# Patient Record
Sex: FEMALE | Race: WHITE | Employment: FULL TIME | URBAN - METROPOLITAN AREA
[De-identification: names, ages, dates, MRNs, and addresses within clinical notes are randomized per-mention and may not be internally consistent; named-entity substitution may affect disease eponyms.]

---

## 2017-01-11 ENCOUNTER — OFFICE VISIT (OUTPATIENT)
Dept: FAMILY MEDICINE CLINIC | Facility: CLINIC | Age: 62
End: 2017-01-11

## 2017-01-11 VITALS
BODY MASS INDEX: 38 KG/M2 | WEIGHT: 238 LBS | SYSTOLIC BLOOD PRESSURE: 145 MMHG | HEART RATE: 101 BPM | DIASTOLIC BLOOD PRESSURE: 98 MMHG | TEMPERATURE: 99 F

## 2017-01-11 DIAGNOSIS — B34.9 ACUTE VIRAL SYNDROME: ICD-10-CM

## 2017-01-11 DIAGNOSIS — J01.10 ACUTE NON-RECURRENT FRONTAL SINUSITIS: Primary | ICD-10-CM

## 2017-01-11 PROCEDURE — 99214 OFFICE O/P EST MOD 30 MIN: CPT | Performed by: FAMILY MEDICINE

## 2017-01-11 PROCEDURE — 99212 OFFICE O/P EST SF 10 MIN: CPT | Performed by: FAMILY MEDICINE

## 2017-01-11 RX ORDER — CODEINE PHOSPHATE AND GUAIFENESIN 10; 100 MG/5ML; MG/5ML
5 SOLUTION ORAL EVERY 6 HOURS PRN
Qty: 120 ML | Refills: 1 | Status: SHIPPED | OUTPATIENT
Start: 2017-01-11 | End: 2017-07-10

## 2017-01-11 RX ORDER — AMOXICILLIN 500 MG/1
500 CAPSULE ORAL 2 TIMES DAILY
Qty: 20 CAPSULE | Refills: 0 | Status: SHIPPED | OUTPATIENT
Start: 2017-01-11 | End: 2017-01-21

## 2017-01-11 NOTE — PROGRESS NOTES
2017 9:56 AM    Niki Frost, : 1955  Patient presents with:  URI: Patient c/o sneezing, body aches, post nasal drip, headache,cough, ear pain, vomiting and diarrhea.  Has been taking OTC theraflu      HPI:     Niki Frost is a 62year old fe Ovarian cyst    • Unspecified essential hypertension        Past Surgical History:       Past Surgical History          REMOVAL OF OVARIAN CYST(S)         Social History:     Social History   Marital Status:   Spouse Name: N/A    Years of E (from the past 10 hour(s)). MDM/Assessment/Plan:   Assessment and Plan:    Diagnosis:    ICD-10-CM    1. Acute non-recurrent frontal sinusitis J01.10    2.  Acute viral syndrome B34.9        MEDICATIONS: •  amoxicillin 500 MG Oral Cap, Take 1 capsule (50

## 2017-07-10 ENCOUNTER — OFFICE VISIT (OUTPATIENT)
Dept: FAMILY MEDICINE CLINIC | Facility: CLINIC | Age: 62
End: 2017-07-10

## 2017-07-10 ENCOUNTER — LAB ENCOUNTER (OUTPATIENT)
Dept: LAB | Age: 62
End: 2017-07-10
Attending: FAMILY MEDICINE
Payer: COMMERCIAL

## 2017-07-10 VITALS
WEIGHT: 238 LBS | HEIGHT: 67 IN | BODY MASS INDEX: 37.35 KG/M2 | SYSTOLIC BLOOD PRESSURE: 112 MMHG | DIASTOLIC BLOOD PRESSURE: 73 MMHG | HEART RATE: 92 BPM

## 2017-07-10 DIAGNOSIS — R60.9 EDEMA, UNSPECIFIED TYPE: ICD-10-CM

## 2017-07-10 DIAGNOSIS — I10 ESSENTIAL HYPERTENSION: ICD-10-CM

## 2017-07-10 DIAGNOSIS — M79.672 LEFT FOOT PAIN: ICD-10-CM

## 2017-07-10 DIAGNOSIS — I10 ESSENTIAL HYPERTENSION: Primary | ICD-10-CM

## 2017-07-10 DIAGNOSIS — Z12.31 VISIT FOR SCREENING MAMMOGRAM: ICD-10-CM

## 2017-07-10 DIAGNOSIS — Z12.11 COLON CANCER SCREENING: ICD-10-CM

## 2017-07-10 LAB
ALBUMIN SERPL BCP-MCNC: 3.6 G/DL (ref 3.5–4.8)
ALBUMIN/GLOB SERPL: 1.2 {RATIO} (ref 1–2)
ALP SERPL-CCNC: 76 U/L (ref 32–100)
ALT SERPL-CCNC: 17 U/L (ref 14–54)
ANION GAP SERPL CALC-SCNC: 7 MMOL/L (ref 0–18)
AST SERPL-CCNC: 17 U/L (ref 15–41)
BASOPHILS # BLD: 0.1 K/UL (ref 0–0.2)
BASOPHILS NFR BLD: 1 %
BILIRUB SERPL-MCNC: 0.7 MG/DL (ref 0.3–1.2)
BUN SERPL-MCNC: 17 MG/DL (ref 8–20)
BUN/CREAT SERPL: 21.8 (ref 10–20)
CALCIUM SERPL-MCNC: 9.2 MG/DL (ref 8.5–10.5)
CHLORIDE SERPL-SCNC: 107 MMOL/L (ref 95–110)
CO2 SERPL-SCNC: 27 MMOL/L (ref 22–32)
CREAT SERPL-MCNC: 0.78 MG/DL (ref 0.5–1.5)
EOSINOPHIL # BLD: 0.2 K/UL (ref 0–0.7)
EOSINOPHIL NFR BLD: 3 %
ERYTHROCYTE [DISTWIDTH] IN BLOOD BY AUTOMATED COUNT: 13.3 % (ref 11–15)
GLOBULIN PLAS-MCNC: 2.9 G/DL (ref 2.5–3.7)
GLUCOSE SERPL-MCNC: 90 MG/DL (ref 70–99)
HCT VFR BLD AUTO: 40 % (ref 35–48)
HGB BLD-MCNC: 13 G/DL (ref 12–16)
LYMPHOCYTES # BLD: 2.1 K/UL (ref 1–4)
LYMPHOCYTES NFR BLD: 30 %
MCH RBC QN AUTO: 27.7 PG (ref 27–32)
MCHC RBC AUTO-ENTMCNC: 32.6 G/DL (ref 32–37)
MCV RBC AUTO: 84.8 FL (ref 80–100)
MONOCYTES # BLD: 0.5 K/UL (ref 0–1)
MONOCYTES NFR BLD: 8 %
NEUTROPHILS # BLD AUTO: 4.1 K/UL (ref 1.8–7.7)
NEUTROPHILS NFR BLD: 59 %
OSMOLALITY UR CALC.SUM OF ELEC: 293 MOSM/KG (ref 275–295)
PLATELET # BLD AUTO: 213 K/UL (ref 140–400)
PMV BLD AUTO: 10.1 FL (ref 7.4–10.3)
POTASSIUM SERPL-SCNC: 4 MMOL/L (ref 3.3–5.1)
PROT SERPL-MCNC: 6.5 G/DL (ref 5.9–8.4)
RBC # BLD AUTO: 4.71 M/UL (ref 3.7–5.4)
SODIUM SERPL-SCNC: 141 MMOL/L (ref 136–144)
TSH SERPL-ACNC: 0.9 UIU/ML (ref 0.45–5.33)
WBC # BLD AUTO: 7 K/UL (ref 4–11)

## 2017-07-10 PROCEDURE — 99214 OFFICE O/P EST MOD 30 MIN: CPT | Performed by: FAMILY MEDICINE

## 2017-07-10 PROCEDURE — 99212 OFFICE O/P EST SF 10 MIN: CPT | Performed by: FAMILY MEDICINE

## 2017-07-10 PROCEDURE — 85025 COMPLETE CBC W/AUTO DIFF WBC: CPT

## 2017-07-10 PROCEDURE — 82274 ASSAY TEST FOR BLOOD FECAL: CPT

## 2017-07-10 PROCEDURE — 80053 COMPREHEN METABOLIC PANEL: CPT

## 2017-07-10 PROCEDURE — 84443 ASSAY THYROID STIM HORMONE: CPT

## 2017-07-10 PROCEDURE — 36415 COLL VENOUS BLD VENIPUNCTURE: CPT

## 2017-07-10 RX ORDER — LOSARTAN POTASSIUM AND HYDROCHLOROTHIAZIDE 12.5; 5 MG/1; MG/1
1 TABLET ORAL
Qty: 30 TABLET | Refills: 11 | Status: SHIPPED | OUTPATIENT
Start: 2017-07-10 | End: 2018-01-09

## 2017-07-10 RX ORDER — NAPROXEN 500 MG/1
500 TABLET ORAL 2 TIMES DAILY WITH MEALS
Qty: 28 TABLET | Refills: 0 | Status: SHIPPED | OUTPATIENT
Start: 2017-07-10 | End: 2017-07-27 | Stop reason: ALTCHOICE

## 2017-07-10 NOTE — PROGRESS NOTES
Willie Fuentes is a 62year old female. Patient presents with:  Hypertension: follow up, medicationr refills  Foot Pain: L     HPI:   Pt reports left foot pain for about 6 months - swelling before that - only on left foot.  Wears only good shoes while working FOB testing only   - OCCULT BLOOD, FECAL, IMMUNOASSAY; Future    3. Left foot pain  Naproxen BID with food. - PODIATRY - INTERNAL    4. Edema, unspecified type    - PODIATRY - INTERNAL  - CARD ECHO 2D DOPPLER (CPT=93306); Future    5.  Visit for screening

## 2017-07-13 ENCOUNTER — HOSPITAL ENCOUNTER (OUTPATIENT)
Dept: GENERAL RADIOLOGY | Age: 62
Discharge: HOME | End: 2017-07-13
Attending: FAMILY MEDICINE
Payer: COMMERCIAL

## 2017-07-13 DIAGNOSIS — M79.672 LEFT FOOT PAIN: ICD-10-CM

## 2017-07-13 LAB — HEMOCCULT STL QL: POSITIVE

## 2017-07-13 PROCEDURE — 73630 X-RAY EXAM OF FOOT: CPT | Performed by: FAMILY MEDICINE

## 2017-07-15 DIAGNOSIS — Z12.11 COLON CANCER SCREENING: Primary | ICD-10-CM

## 2017-07-15 DIAGNOSIS — R19.5 POSITIVE OCCULT STOOL BLOOD TEST: ICD-10-CM

## 2017-07-15 NOTE — PROGRESS NOTES
Please let know that she was positive for blood in her stool and I would like her to see the GI doctor to have the colonoscopy for sure. I have placed a referral for her to set up an appointment.

## 2017-07-17 ENCOUNTER — HOSPITAL ENCOUNTER (OUTPATIENT)
Dept: MAMMOGRAPHY | Age: 62
Discharge: HOME | End: 2017-07-17
Attending: FAMILY MEDICINE
Payer: COMMERCIAL

## 2017-07-17 ENCOUNTER — TELEPHONE (OUTPATIENT)
Dept: FAMILY MEDICINE CLINIC | Facility: CLINIC | Age: 62
End: 2017-07-17

## 2017-07-17 ENCOUNTER — HOSPITAL ENCOUNTER (OUTPATIENT)
Dept: CARDIOLOGY CLINIC | Age: 62
Discharge: HOME | End: 2017-07-17
Attending: FAMILY MEDICINE
Payer: COMMERCIAL

## 2017-07-17 DIAGNOSIS — I10 ESSENTIAL HYPERTENSION: ICD-10-CM

## 2017-07-17 DIAGNOSIS — R60.9 EDEMA, UNSPECIFIED TYPE: ICD-10-CM

## 2017-07-17 DIAGNOSIS — Z12.31 VISIT FOR SCREENING MAMMOGRAM: ICD-10-CM

## 2017-07-17 PROCEDURE — 93306 TTE W/DOPPLER COMPLETE: CPT | Performed by: FAMILY MEDICINE

## 2017-07-17 PROCEDURE — 77067 SCR MAMMO BI INCL CAD: CPT | Performed by: FAMILY MEDICINE

## 2017-07-17 NOTE — TELEPHONE ENCOUNTER
----- Message from Katerin Vasquez MD sent at 7/15/2017  2:15 PM CDT -----  Please let know that she was positive for blood in her stool and I would like her to see the GI doctor to have the colonoscopy for sure.   I have placed a referral for her to set u

## 2017-07-17 NOTE — TELEPHONE ENCOUNTER
Spoke with patient ( verified name and ), reviewed information, patient verbalized understanding and agrees with plan. States will call back to get GI doctor name and # as she is at work right now and not able to write the information down.  Patient Mary Arrieta

## 2017-07-17 NOTE — TELEPHONE ENCOUNTER
----- Message from Alma Delia Gamez MD sent at 7/15/2017  2:16 PM CDT -----  Tests are all normal. X-ray was normal - no fracture

## 2017-07-17 NOTE — TELEPHONE ENCOUNTER
----- Message from Enedina Resendez MD sent at 7/15/2017  2:07 PM CDT -----  Labs are stable. No changes to medications.

## 2017-07-27 ENCOUNTER — OFFICE VISIT (OUTPATIENT)
Dept: PODIATRY CLINIC | Facility: CLINIC | Age: 62
End: 2017-07-27

## 2017-07-27 ENCOUNTER — TELEPHONE (OUTPATIENT)
Dept: FAMILY MEDICINE CLINIC | Facility: CLINIC | Age: 62
End: 2017-07-27

## 2017-07-27 DIAGNOSIS — M72.2 PLANTAR FASCIITIS OF LEFT FOOT: ICD-10-CM

## 2017-07-27 DIAGNOSIS — M79.672 LEFT FOOT PAIN: Primary | ICD-10-CM

## 2017-07-27 PROCEDURE — 99212 OFFICE O/P EST SF 10 MIN: CPT | Performed by: PODIATRIST

## 2017-07-27 PROCEDURE — 99243 OFF/OP CNSLTJ NEW/EST LOW 30: CPT | Performed by: PODIATRIST

## 2017-07-27 PROCEDURE — L3060 FOOT ARCH SUPP LONGITUD/META: HCPCS | Performed by: PODIATRIST

## 2017-07-27 RX ORDER — NAPROXEN 500 MG/1
500 TABLET ORAL 2 TIMES DAILY WITH MEALS
Qty: 60 TABLET | Refills: 1 | Status: SHIPPED | OUTPATIENT
Start: 2017-07-27 | End: 2018-01-09 | Stop reason: ALTCHOICE

## 2017-07-27 NOTE — PROGRESS NOTES
HPI:    Patient ID: Uinque Moss is a 62year old female. HPI  This pleasant 72-year-old female presents as a new patient to me on consult from [facility]. Patient's chief complaint is swelling and pain of the left foot.   It has been present for the las instructed to ice twice every evening for 15 minutes. I plan to reevaluate in a couple of weeks to assess the benefits of care with other considerations of treatment depending upon how she responds.   See patient in 3 weeks         ASSESSMENT/PLAN:   Left

## 2017-07-27 NOTE — TELEPHONE ENCOUNTER
Pt states that she has an appt with /Podiatry on 8-21-17 for follow up. Pt is requesting 3 office visits on the referral. Please, call pt with any questions.

## 2017-08-03 ENCOUNTER — TELEPHONE (OUTPATIENT)
Dept: FAMILY MEDICINE CLINIC | Facility: CLINIC | Age: 62
End: 2017-08-03

## 2017-08-03 NOTE — TELEPHONE ENCOUNTER
Patient called and stated has questions and concerns regarding the following medication-  Patient is requesting a call back from nurse-      Current Outpatient Prescriptions:  Losartan Potassium-HCTZ 50-12.5 MG Oral Tab Take 1 tablet by mouth once daily.  D

## 2017-08-04 NOTE — TELEPHONE ENCOUNTER
Pt wondering if an additional seperate diuretic would be helpful. Pt experiencing swelling in both feet but worse on left. States PCP is aware of it and referred to podiatry. Pt advised in F/U appt but states unable to commit at this time.   Encouraged t

## 2017-08-05 NOTE — TELEPHONE ENCOUNTER
Reason for call changed from medical question to medication question  LMTCB please transfer to RN [phone]

## 2017-08-08 NOTE — TELEPHONE ENCOUNTER
Pt returned call, reviewed Dr Dewayne Cardoso orders with pt. Stated the cost is not the issue because she only pays $4.00 for losartan-hctz 50-12 mg. Inquiring why she continues with edema to lower legs and if an alternative medication would work better.  Stated she d

## 2017-08-11 RX ORDER — LOSARTAN POTASSIUM 50 MG/1
50 TABLET ORAL DAILY
Qty: 90 TABLET | Refills: 4 | Status: SHIPPED | OUTPATIENT
Start: 2017-08-11 | End: 2018-01-09

## 2017-08-11 RX ORDER — CHLORTHALIDONE 25 MG/1
25 TABLET ORAL DAILY
Qty: 90 TABLET | Refills: 1 | Status: SHIPPED | OUTPATIENT
Start: 2017-08-11 | End: 2018-01-09

## 2017-08-11 NOTE — TELEPHONE ENCOUNTER
[phone] Nannette Strickland I will try send losartan 50 mg daily and take separate water pill chlorthalidone in  Mornings

## 2017-09-25 ENCOUNTER — TELEPHONE (OUTPATIENT)
Dept: INTERNAL MEDICINE CLINIC | Facility: CLINIC | Age: 62
End: 2017-09-25

## 2017-09-25 NOTE — TELEPHONE ENCOUNTER
Patient due for colonoscopy/GI consult, please see 7/17/17 encounter. Referral is in Epic. If patient returns call please assist in scheduling. Left message to call back [phone].

## 2018-01-09 ENCOUNTER — OFFICE VISIT (OUTPATIENT)
Dept: FAMILY MEDICINE CLINIC | Facility: CLINIC | Age: 63
End: 2018-01-09

## 2018-01-09 VITALS
WEIGHT: 242.63 LBS | SYSTOLIC BLOOD PRESSURE: 116 MMHG | BODY MASS INDEX: 38 KG/M2 | HEART RATE: 89 BPM | DIASTOLIC BLOOD PRESSURE: 75 MMHG

## 2018-01-09 DIAGNOSIS — K21.9 GASTROESOPHAGEAL REFLUX DISEASE WITHOUT ESOPHAGITIS: ICD-10-CM

## 2018-01-09 DIAGNOSIS — I10 ESSENTIAL HYPERTENSION: Primary | ICD-10-CM

## 2018-01-09 PROCEDURE — 99212 OFFICE O/P EST SF 10 MIN: CPT | Performed by: FAMILY MEDICINE

## 2018-01-09 PROCEDURE — 99213 OFFICE O/P EST LOW 20 MIN: CPT | Performed by: FAMILY MEDICINE

## 2018-01-09 RX ORDER — LOSARTAN POTASSIUM 50 MG/1
50 TABLET ORAL DAILY
Qty: 90 TABLET | Refills: 4 | Status: SHIPPED | OUTPATIENT
Start: 2018-01-09 | End: 2018-07-12

## 2018-01-09 RX ORDER — CHLORTHALIDONE 25 MG/1
25 TABLET ORAL DAILY
Qty: 90 TABLET | Refills: 4 | Status: ON HOLD | OUTPATIENT
Start: 2018-01-09 | End: 2018-03-27

## 2018-01-09 RX ORDER — RANITIDINE 300 MG/1
150 TABLET ORAL NIGHTLY
Qty: 45 TABLET | Refills: 4 | Status: SHIPPED | OUTPATIENT
Start: 2018-01-09 | End: 2019-01-12

## 2018-01-09 NOTE — PROGRESS NOTES
Kye Cotton is a 65year old female. Patient presents with:  Hypertension  Reflux    HPI:   Pt here for follow up for BP check but also new heartburn is almost daily. Tried otc meds - prilosec but expensive. Been using it as needed.  Eats less spicy food 1. Essential hypertension  BP controlled. No changes to medications. 2. Gastroesophageal reflux disease without esophagitis  Ranitidine evenings. Decrease coffee.  Stop NSAIDs for pain - tylenol           The patient indicates understanding of these i

## 2018-03-22 ENCOUNTER — NURSE TRIAGE (OUTPATIENT)
Dept: OTHER | Age: 63
End: 2018-03-22

## 2018-03-22 NOTE — TELEPHONE ENCOUNTER
Action Requested: Summary for Provider     []  Critical Lab, Recommendations Needed  [x] Need Additional Advice  []   FYI    []   Need Orders  [] Need Medications Sent to Pharmacy  []  Other     SUMMARY: pt declined recommendation to go to ER for vomiting

## 2018-03-22 NOTE — TELEPHONE ENCOUNTER
Sounds like she has the flu. Hydration with smart water. If not keeping down fluids, needs to go to the ER for fluid rehydration. Sorry but cannot see her. Can see if someone available for tomorrow if not improving.

## 2018-03-22 NOTE — TELEPHONE ENCOUNTER
Advised patient on Dr. Tom Cheung information and recommendations. Patient verbalized understanding. She said her last vomiting was this morning, 1 hour after her call here and her diarrhea stopped this morning.  She's been drinking sips of water and Gatorade,

## 2018-03-23 ENCOUNTER — HOSPITAL ENCOUNTER (OUTPATIENT)
Age: 63
Discharge: HOME | End: 2018-03-23
Attending: EMERGENCY MEDICINE
Payer: COMMERCIAL

## 2018-03-23 VITALS
SYSTOLIC BLOOD PRESSURE: 116 MMHG | RESPIRATION RATE: 18 BRPM | HEART RATE: 95 BPM | DIASTOLIC BLOOD PRESSURE: 52 MMHG | WEIGHT: 220 LBS | BODY MASS INDEX: 34 KG/M2 | OXYGEN SATURATION: 97 % | TEMPERATURE: 98 F

## 2018-03-23 DIAGNOSIS — E86.0 DEHYDRATION: Primary | ICD-10-CM

## 2018-03-23 LAB
URINE COLOR: YELLOW
URINE GLUCOSE: NEGATIVE MG/DL
URINE KETONES: 15 MG/DL
URINE LEUKOCYTE ESTERASE: NEGATIVE
URINE NITRITE: NEGATIVE
URINE PH: 5.5
URINE SPECIFIC GRAVITY: >=1.03
URINE UROBILINOGEN: 1 MG/DL

## 2018-03-23 PROCEDURE — 96361 HYDRATE IV INFUSION ADD-ON: CPT

## 2018-03-23 PROCEDURE — 96374 THER/PROPH/DIAG INJ IV PUSH: CPT

## 2018-03-23 PROCEDURE — 81002 URINALYSIS NONAUTO W/O SCOPE: CPT

## 2018-03-23 PROCEDURE — 99215 OFFICE O/P EST HI 40 MIN: CPT

## 2018-03-23 RX ORDER — ONDANSETRON 2 MG/ML
4 INJECTION INTRAMUSCULAR; INTRAVENOUS ONCE
Status: COMPLETED | OUTPATIENT
Start: 2018-03-23 | End: 2018-03-23

## 2018-03-23 RX ORDER — 0.9 % SODIUM CHLORIDE 0.9 %
1000 INTRAVENOUS SOLUTION INTRAVENOUS ONCE
Status: COMPLETED | OUTPATIENT
Start: 2018-03-23 | End: 2018-03-23

## 2018-03-23 RX ORDER — ONDANSETRON 4 MG/1
4 TABLET, FILM COATED ORAL EVERY 8 HOURS PRN
Qty: 10 TABLET | Refills: 0 | Status: SHIPPED | OUTPATIENT
Start: 2018-03-23 | End: 2018-03-23

## 2018-03-23 RX ORDER — ONDANSETRON 4 MG/1
4 TABLET, FILM COATED ORAL EVERY 8 HOURS PRN
Qty: 10 TABLET | Refills: 0 | Status: SHIPPED | OUTPATIENT
Start: 2018-03-23 | End: 2018-04-03

## 2018-03-23 NOTE — ED PROVIDER NOTES
Patient Seen in: [facility] In [address] [place] [address]    History   Patient presents with:  Nausea/Vomiting/Diarrhea (gastrointestinal)    Stated Complaint: vomiting,weak    HPI    58-year-old female patient states that she had 2-3 days of diarrhe Exam    Gen: Awake alert in no apparent distress  HEENT: Anicteric, EOMI, PERRL, dry  oropharynx  Heart: Regular rate and rhythm, no murmur  Lungs: Normal respiratory effort, clear lungs  Abdomen: Soft,  nondistended, non tender  : No CVA tenderness  Ski hours as needed for Nausea.   Qty: 10 tablet Refills: 0

## 2018-03-23 NOTE — TELEPHONE ENCOUNTER
Called pt as a f/u and pt states she feels weak today (pt sounded tired on the phone) but again refuses ER and declines my calling an ambulance. States is able keep fluids though as is no longer vomiting (Gatorade and water).  Informed pt if covered by insu

## 2018-03-23 NOTE — ED INITIAL ASSESSMENT (HPI)
Ill for past week with vomiting and diarrhea. Called pmd and instructed to go to ED. Waited for today.

## 2018-03-24 NOTE — TELEPHONE ENCOUNTER
Pt returned call. States she was seen at [facility] yesterday. Received IV hydration and rx for zofran. Today pt denied any vomiting, diarrhea. States she feels slightly better. Has not had a need to take zofran rx.  Will continue to stay hydrated at home and foll

## 2018-03-24 NOTE — TELEPHONE ENCOUNTER
Patient was left a message to call back. f/u on how patient is doing. Transfer to [id number]      Chart reviewed; patient did go to IC on 3/23/18; IV fluids given, rx for zofran.

## 2018-03-25 ENCOUNTER — APPOINTMENT (OUTPATIENT)
Dept: GENERAL RADIOLOGY | Facility: HOSPITAL | Age: 63
End: 2018-03-25
Attending: EMERGENCY MEDICINE
Payer: COMMERCIAL

## 2018-03-25 ENCOUNTER — APPOINTMENT (OUTPATIENT)
Dept: ULTRASOUND IMAGING | Facility: HOSPITAL | Age: 63
End: 2018-03-25
Attending: HOSPITALIST
Payer: COMMERCIAL

## 2018-03-25 ENCOUNTER — HOSPITAL ENCOUNTER (OUTPATIENT)
Facility: HOSPITAL | Age: 63
Setting detail: OBSERVATION
Discharge: HOME | End: 2018-03-27
Attending: EMERGENCY MEDICINE | Admitting: HOSPITALIST
Payer: COMMERCIAL

## 2018-03-25 DIAGNOSIS — E86.0 DEHYDRATION: ICD-10-CM

## 2018-03-25 DIAGNOSIS — E87.6 HYPOKALEMIA: Primary | ICD-10-CM

## 2018-03-25 LAB
ADENOVIRUS F 40/41 PCR: NEGATIVE
ANION GAP SERPL CALC-SCNC: 14 MMOL/L (ref 0–18)
ASTROVIRUS PCR: NEGATIVE
BASOPHILS # BLD: 0.5 K/UL (ref 0–0.2)
BASOPHILS NFR BLD: 3 %
BILIRUB UR QL: NEGATIVE
BUN SERPL-MCNC: 17 MG/DL (ref 8–20)
BUN/CREAT SERPL: 8.1 (ref 10–20)
C CAYETANENSIS DNA SPEC QL NAA+PROBE: NEGATIVE
C. DIFFICILE TOXIN A/B PCR: NEGATIVE
CALCIUM SERPL-MCNC: 8.5 MG/DL (ref 8.5–10.5)
CAMPY SP DNA.DIARRHEA STL QL NAA+PROBE: NEGATIVE
CHLORIDE SERPL-SCNC: 93 MMOL/L (ref 95–110)
CO2 SERPL-SCNC: 30 MMOL/L (ref 22–32)
COLOR UR: YELLOW
CREAT SERPL-MCNC: 1.88 MG/DL (ref 0.5–1.5)
CREAT SERPL-MCNC: 2.1 MG/DL (ref 0.5–1.5)
CRYPTOSP DNA SPEC QL NAA+PROBE: NEGATIVE
EAEC PAA PLAS AGGR+AATA ST NAA+NON-PRB: NEGATIVE
EC STX1+STX2 + H7 FLIC SPEC NAA+PROBE: NEGATIVE
ENTAMOEBA HISTOLYTICA PCR: NEGATIVE
EOSINOPHIL # BLD: 0.3 K/UL (ref 0–0.7)
EOSINOPHIL NFR BLD: 2 %
EPEC EAE GENE STL QL NAA+NON-PROBE: NEGATIVE
ERYTHROCYTE [DISTWIDTH] IN BLOOD BY AUTOMATED COUNT: 14.2 % (ref 11–15)
ETEC LTA+ST1A+ST1B TOX ST NAA+NON-PROBE: NEGATIVE
GIARDIA LAMBLIA PCR: NEGATIVE
GLUCOSE SERPL-MCNC: 143 MG/DL (ref 70–99)
GLUCOSE UR-MCNC: 50 MG/DL
HCT VFR BLD AUTO: 35.9 % (ref 35–48)
HGB BLD-MCNC: 12.4 G/DL (ref 12–16)
HYALINE CASTS #/AREA URNS AUTO: 12 /LPF
LEUKOCYTE ESTERASE UR QL STRIP.AUTO: NEGATIVE
LYMPHOCYTES # BLD: 2.7 K/UL (ref 1–4)
LYMPHOCYTES NFR BLD: 18 %
MCH RBC QN AUTO: 28.3 PG (ref 27–32)
MCHC RBC AUTO-ENTMCNC: 34.6 G/DL (ref 32–37)
MCV RBC AUTO: 81.7 FL (ref 80–100)
METAMYELOCYTES # BLD MANUAL: 0.15 K/UL
MONOCYTES # BLD: 0.5 K/UL (ref 0–1)
MONOCYTES NFR BLD: 3 %
MYELOCYTES NFR BLD: 1 %
NEUTROPHILS # BLD AUTO: 10.9 K/UL (ref 1.8–7.7)
NEUTROPHILS NFR BLD: 72 %
NEUTS BAND NFR BLD: 1 %
NITRITE UR QL STRIP.AUTO: NEGATIVE
NOROVIRUS GI/GII PCR: NEGATIVE
NRBC BLD-RTO: 1 % (ref ?–1)
OSMOLALITY UR CALC.SUM OF ELEC: 288 MOSM/KG (ref 275–295)
P SHIGELLOIDES DNA STL QL NAA+PROBE: NEGATIVE
PH UR: 5 [PH] (ref 5–8)
PLATELET # BLD AUTO: 235 K/UL (ref 140–400)
PMV BLD AUTO: 9.3 FL (ref 7.4–10.3)
POTASSIUM SERPL-SCNC: 2.2 MMOL/L (ref 3.3–5.1)
POTASSIUM SERPL-SCNC: 3 MMOL/L (ref 3.3–5.1)
PROT UR-MCNC: 30 MG/DL
RBC # BLD AUTO: 4.39 M/UL (ref 3.7–5.4)
RBC #/AREA URNS AUTO: 5 /HPF
ROTAVIRUS A PCR: NEGATIVE
SALMONELLA DNA SPEC QL NAA+PROBE: NEGATIVE
SAPOVIRUS PCR: NEGATIVE
SHIGELLA SP+EIEC IPAH ST NAA+NON-PROBE: NEGATIVE
SODIUM SERPL-SCNC: 137 MMOL/L (ref 136–144)
SP GR UR STRIP: 1.02 (ref 1–1.03)
UROBILINOGEN UR STRIP-ACNC: <2
V CHOLERAE DNA SPEC QL NAA+PROBE: NEGATIVE
VIBRIO DNA SPEC NAA+PROBE: NEGATIVE
VIT C UR-MCNC: NEGATIVE MG/DL
WBC # BLD AUTO: 14.9 K/UL (ref 4–11)
WBC #/AREA URNS AUTO: 12 /HPF
YERSINIA DNA SPEC NAA+PROBE: NEGATIVE

## 2018-03-25 PROCEDURE — 71046 X-RAY EXAM CHEST 2 VIEWS: CPT | Performed by: EMERGENCY MEDICINE

## 2018-03-25 PROCEDURE — 99219 INITIAL OBSERVATION CARE,LEVL II: CPT | Performed by: HOSPITALIST

## 2018-03-25 PROCEDURE — 93971 EXTREMITY STUDY: CPT | Performed by: HOSPITALIST

## 2018-03-25 RX ORDER — MORPHINE SULFATE 2 MG/ML
1 INJECTION, SOLUTION INTRAMUSCULAR; INTRAVENOUS EVERY 2 HOUR PRN
Status: DISCONTINUED | OUTPATIENT
Start: 2018-03-25 | End: 2018-03-27

## 2018-03-25 RX ORDER — POTASSIUM CHLORIDE 20 MEQ/1
40 TABLET, EXTENDED RELEASE ORAL ONCE
Status: COMPLETED | OUTPATIENT
Start: 2018-03-25 | End: 2018-03-25

## 2018-03-25 RX ORDER — ACETAMINOPHEN 325 MG/1
650 TABLET ORAL EVERY 6 HOURS PRN
Status: DISCONTINUED | OUTPATIENT
Start: 2018-03-25 | End: 2018-03-25

## 2018-03-25 RX ORDER — PANTOPRAZOLE SODIUM 40 MG/1
40 TABLET, DELAYED RELEASE ORAL
Status: DISCONTINUED | OUTPATIENT
Start: 2018-03-26 | End: 2018-03-27

## 2018-03-25 RX ORDER — HEPARIN SODIUM 5000 [USP'U]/ML
5000 INJECTION, SOLUTION INTRAVENOUS; SUBCUTANEOUS EVERY 8 HOURS SCHEDULED
Status: DISCONTINUED | OUTPATIENT
Start: 2018-03-25 | End: 2018-03-27

## 2018-03-25 RX ORDER — MORPHINE SULFATE 4 MG/ML
4 INJECTION, SOLUTION INTRAMUSCULAR; INTRAVENOUS EVERY 2 HOUR PRN
Status: DISCONTINUED | OUTPATIENT
Start: 2018-03-25 | End: 2018-03-27

## 2018-03-25 RX ORDER — ONDANSETRON 2 MG/ML
4 INJECTION INTRAMUSCULAR; INTRAVENOUS EVERY 4 HOURS PRN
Status: DISCONTINUED | OUTPATIENT
Start: 2018-03-25 | End: 2018-03-27

## 2018-03-25 RX ORDER — MORPHINE SULFATE 2 MG/ML
2 INJECTION, SOLUTION INTRAMUSCULAR; INTRAVENOUS EVERY 2 HOUR PRN
Status: DISCONTINUED | OUTPATIENT
Start: 2018-03-25 | End: 2018-03-27

## 2018-03-25 RX ORDER — NITROGLYCERIN 0.4 MG/1
0.4 TABLET SUBLINGUAL EVERY 5 MIN PRN
Status: DISCONTINUED | OUTPATIENT
Start: 2018-03-25 | End: 2018-03-27

## 2018-03-25 RX ORDER — HYDROCODONE BITARTRATE AND ACETAMINOPHEN 5; 325 MG/1; MG/1
2 TABLET ORAL EVERY 4 HOURS PRN
Status: DISCONTINUED | OUTPATIENT
Start: 2018-03-25 | End: 2018-03-27

## 2018-03-25 RX ORDER — ACETAMINOPHEN 500 MG
1000 TABLET ORAL ONCE
Status: COMPLETED | OUTPATIENT
Start: 2018-03-25 | End: 2018-03-25

## 2018-03-25 RX ORDER — ACETAMINOPHEN 325 MG/1
650 TABLET ORAL EVERY 4 HOURS PRN
Status: DISCONTINUED | OUTPATIENT
Start: 2018-03-25 | End: 2018-03-27

## 2018-03-25 RX ORDER — ZOLPIDEM TARTRATE 5 MG/1
5 TABLET ORAL NIGHTLY PRN
Status: DISCONTINUED | OUTPATIENT
Start: 2018-03-25 | End: 2018-03-27

## 2018-03-25 RX ORDER — HYDROCODONE BITARTRATE AND ACETAMINOPHEN 5; 325 MG/1; MG/1
1 TABLET ORAL EVERY 4 HOURS PRN
Status: DISCONTINUED | OUTPATIENT
Start: 2018-03-25 | End: 2018-03-27

## 2018-03-25 RX ORDER — SODIUM CHLORIDE 0.9 % (FLUSH) 0.9 %
3 SYRINGE (ML) INJECTION AS NEEDED
Status: DISCONTINUED | OUTPATIENT
Start: 2018-03-25 | End: 2018-03-27

## 2018-03-25 RX ORDER — HYDRALAZINE HYDROCHLORIDE 20 MG/ML
10 INJECTION INTRAMUSCULAR; INTRAVENOUS EVERY 4 HOURS PRN
Status: DISCONTINUED | OUTPATIENT
Start: 2018-03-25 | End: 2018-03-27

## 2018-03-25 NOTE — ED INITIAL ASSESSMENT (HPI)
Pt seen at [address] [place] [address] on Friday with N/V/D. Is able to keep fluids down and states that she has stopped vomiting and having diarrhea, but feels weak.

## 2018-03-25 NOTE — ED NOTES
Patient states that lest week she had some viral N/V/D. Was seen a few days ago at [address] [place] [address] in [address] [place] [address] and given IV fluids, which she says made her feel better. Although she is able to keep down fluids, she stays she still feels weak. Denies N/V/D.

## 2018-03-25 NOTE — PLAN OF CARE
Patient/Family Goals    • Patient/Family Long Term Goal Progressing    • Patient/Family Short Term Goal Progressing        Pt admitted with weakness following virus at home. Electrolyte replacment. Iv fluids. Clear liquid diet. Gi panel.

## 2018-03-25 NOTE — ED PROVIDER NOTES
Patient Seen in: [facility] Department    History   Patient presents with:  Weakness    Stated Complaint: weakness    HPI    60-year-old female with history of hypertension presents with complaints of nausea, generalized weakness, body a alert, no distress  Eyes: pupils equal and round no pallor or injection  ENT, Mouth: mucous membranes moist  Respiratory: there are no retractions, lungs are clear to auscultation. No chest wall tenderness.   Cardiovascular: regular rate and rhythm  Gastro monitoring.   Discussed with Dr. Cher Freitas, hospitalist.      Admission disposition: 3/25/2018 12:50 PM           Disposition and Plan     Clinical Impression:  Hypokalemia  (primary encounter diagnosis)  Dehydration    Disposition:  Admit  3/25/2018 12:50 p

## 2018-03-26 LAB
ALBUMIN SERPL BCP-MCNC: 2.7 G/DL (ref 3.5–4.8)
ALBUMIN/GLOB SERPL: 1.1 {RATIO} (ref 1–2)
ALP SERPL-CCNC: 86 U/L (ref 32–100)
ALT SERPL-CCNC: 73 U/L (ref 14–54)
ANION GAP SERPL CALC-SCNC: 12 MMOL/L (ref 0–18)
AST SERPL-CCNC: 70 U/L (ref 15–41)
BASOPHILS # BLD: 0 K/UL (ref 0–0.2)
BASOPHILS NFR BLD: 0 %
BILIRUB SERPL-MCNC: 1.3 MG/DL (ref 0.3–1.2)
BUN SERPL-MCNC: 18 MG/DL (ref 8–20)
BUN/CREAT SERPL: 13.3 (ref 10–20)
CALCIUM SERPL-MCNC: 7.6 MG/DL (ref 8.5–10.5)
CHLORIDE SERPL-SCNC: 98 MMOL/L (ref 95–110)
CHLORIDE, RANDOM URINE: 105 MMOL/L
CO2 SERPL-SCNC: 26 MMOL/L (ref 22–32)
CREAT SERPL-MCNC: 1.35 MG/DL (ref 0.5–1.5)
EOSINOPHIL # BLD: 0 K/UL (ref 0–0.7)
EOSINOPHIL NFR BLD: 0 %
ERYTHROCYTE [DISTWIDTH] IN BLOOD BY AUTOMATED COUNT: 14.1 % (ref 11–15)
GLOBULIN PLAS-MCNC: 2.4 G/DL (ref 2.5–3.7)
GLUCOSE SERPL-MCNC: 100 MG/DL (ref 70–99)
HCT VFR BLD AUTO: 32.4 % (ref 35–48)
HGB BLD-MCNC: 11.3 G/DL (ref 12–16)
LYMPHOCYTES # BLD: 2.4 K/UL (ref 1–4)
LYMPHOCYTES NFR BLD: 15 %
MAGNESIUM SERPL-MCNC: 1.2 MG/DL (ref 1.8–2.5)
MAGNESIUM SERPL-MCNC: 1.5 MG/DL (ref 1.8–2.5)
MCH RBC QN AUTO: 28.6 PG (ref 27–32)
MCHC RBC AUTO-ENTMCNC: 34.9 G/DL (ref 32–37)
MCV RBC AUTO: 81.9 FL (ref 80–100)
METAMYELOCYTES # BLD MANUAL: 0.16 K/UL
MONOCYTES # BLD: 1.3 K/UL (ref 0–1)
MONOCYTES NFR BLD: 8 %
MYELOCYTES NFR BLD: 1 %
NEUTROPHILS # BLD AUTO: 12.3 K/UL (ref 1.8–7.7)
NEUTROPHILS NFR BLD: 70 %
NEUTS BAND NFR BLD: 6 %
NRBC BLD-RTO: 1 % (ref ?–1)
OSMOLALITY UR CALC.SUM OF ELEC: 284 MOSM/KG (ref 275–295)
PATIENT FASTING: NO
PLATELET # BLD AUTO: 237 K/UL (ref 140–400)
PMV BLD AUTO: 9.6 FL (ref 7.4–10.3)
POTASSIUM SERPL-SCNC: 2.6 MMOL/L (ref 3.3–5.1)
POTASSIUM SERPL-SCNC: 3.3 MMOL/L (ref 3.3–5.1)
POTASSIUM SERPL-SCNC: 3.4 MMOL/L (ref 3.3–5.1)
POTASSIUM UR-SCNC: 42 MMOL/L
PROT SERPL-MCNC: 5.1 G/DL (ref 5.9–8.4)
RBC # BLD AUTO: 3.95 M/UL (ref 3.7–5.4)
SODIUM SERPL-SCNC: 136 MMOL/L (ref 136–144)
SODIUM UR-SCNC: 49 MMOL/L
TSH SERPL-ACNC: 1.27 UIU/ML (ref 0.45–5.33)
WBC # BLD AUTO: 16.2 K/UL (ref 4–11)

## 2018-03-26 PROCEDURE — 99226 SUBSEQUENT OBSERVATION CARE: CPT | Performed by: HOSPITALIST

## 2018-03-26 RX ORDER — MAGNESIUM OXIDE 400 MG (241.3 MG MAGNESIUM) TABLET
800 TABLET ONCE
Status: COMPLETED | OUTPATIENT
Start: 2018-03-27 | End: 2018-03-27

## 2018-03-26 RX ORDER — 0.9 % SODIUM CHLORIDE 0.9 %
VIAL (ML) INJECTION
Status: COMPLETED
Start: 2018-03-26 | End: 2018-03-26

## 2018-03-26 RX ORDER — MAGNESIUM SULFATE 1 G/100ML
1 INJECTION INTRAVENOUS ONCE
Status: COMPLETED | OUTPATIENT
Start: 2018-03-26 | End: 2018-03-27

## 2018-03-26 RX ORDER — MAGNESIUM OXIDE 400 MG (241.3 MG MAGNESIUM) TABLET
800 TABLET ONCE
Status: COMPLETED | OUTPATIENT
Start: 2018-03-26 | End: 2018-03-26

## 2018-03-26 NOTE — PLAN OF CARE
Problem: Patient/Family Goals  Goal: Patient/Family Long Term Goal  Patient's Long Term Goal:  Advance diet, increased strenghth    Interventions:  - electrolyte replacment  Routine labs  Clear liquid diet    - See additional Care Plan goals for specific i complete, and accurate information to patient/family  - Incorporate patient and family knowledge, values, beliefs, and cultural backgrounds into the planning and delivery of care  - Encourage patient/family to participate in care and decision-making at the injury  - Provide assistive devices as appropriate  - Consider OT/PT consult to assist with strengthening/mobility  - Encourage toileting schedule  Outcome: Progressing  Pt remains free of falls, call light within reach, calls appropriate for assistance wh

## 2018-03-26 NOTE — PLAN OF CARE
Problem: Patient/Family Goals  Goal: Patient/Family Long Term Goal  Patient's Long Term Goal:  Advance diet, increased strenghth    Interventions:  - electrolyte replacment  Routine labs  Clear liquid diet    - See additional Care Plan goals for specific i

## 2018-03-26 NOTE — PROGRESS NOTES
[facility]    Progress Note    Seferino Calhoun Patient Status:  Observation    1955 MRN [id number]   Location [address] [facility] Attending Georgi Liz Day # 0 PCP Glenys Oden MD     Subjective:     Celeste Chaudhary 237 03/26/2018   CREATSERUM 1.35 03/26/2018   BUN 18 03/26/2018    03/26/2018   K 2.6 (L) 03/26/2018   CL 98 03/26/2018   CO2 26 03/26/2018    (H) 03/26/2018   CA 7.6 (L) 03/26/2018   ALB 2.7 (L) 03/26/2018   ALKPHO 86 03/26/2018   BILT 1.3 (H

## 2018-03-26 NOTE — H&P
[facility]    PATIENT'S NAME: Jalen Alexandrea   ATTENDING PHYSICIAN: Ramona Liz MD   PATIENT ACCOUNT#:   [id number]    LOCATION:  [address] [place] [address] RECORD #:   [id number]       YOB: 1955  ADMISSION DATE:       03/25/201 left leg swelling and had a workup with an ankle x-ray.   She has no unusual headache, loss of vision, double vision, earaches, difficulty swallowing, hemoptysis, hematemesis, coffee-ground emesis, melena, hematochezia, dysuria, hematuria, and no unusual sk

## 2018-03-27 VITALS
TEMPERATURE: 98 F | HEART RATE: 103 BPM | BODY MASS INDEX: 39.6 KG/M2 | DIASTOLIC BLOOD PRESSURE: 57 MMHG | HEIGHT: 67 IN | OXYGEN SATURATION: 95 % | RESPIRATION RATE: 18 BRPM | SYSTOLIC BLOOD PRESSURE: 132 MMHG | WEIGHT: 252.31 LBS

## 2018-03-27 LAB
ANION GAP SERPL CALC-SCNC: 11 MMOL/L (ref 0–18)
BASOPHILS # BLD: 0.2 K/UL (ref 0–0.2)
BASOPHILS NFR BLD: 1 %
BUN SERPL-MCNC: 13 MG/DL (ref 8–20)
BUN/CREAT SERPL: 12.4 (ref 10–20)
CALCIUM SERPL-MCNC: 7.8 MG/DL (ref 8.5–10.5)
CHLORIDE SERPL-SCNC: 100 MMOL/L (ref 95–110)
CO2 SERPL-SCNC: 25 MMOL/L (ref 22–32)
CREAT SERPL-MCNC: 1.05 MG/DL (ref 0.5–1.5)
EOSINOPHIL # BLD: 0.3 K/UL (ref 0–0.7)
EOSINOPHIL NFR BLD: 2 %
ERYTHROCYTE [DISTWIDTH] IN BLOOD BY AUTOMATED COUNT: 14.9 % (ref 11–15)
GLUCOSE SERPL-MCNC: 99 MG/DL (ref 70–99)
HCT VFR BLD AUTO: 32.2 % (ref 35–48)
HGB BLD-MCNC: 11 G/DL (ref 12–16)
LYMPHOCYTES # BLD: 1.6 K/UL (ref 1–4)
LYMPHOCYTES NFR BLD: 10 %
MAGNESIUM SERPL-MCNC: 1.6 MG/DL (ref 1.8–2.5)
MCH RBC QN AUTO: 28.3 PG (ref 27–32)
MCHC RBC AUTO-ENTMCNC: 34.2 G/DL (ref 32–37)
MCV RBC AUTO: 82.8 FL (ref 80–100)
METAMYELOCYTES # BLD MANUAL: 0.47 K/UL
METAMYELOCYTES NFR BLD: 3 %
MONOCYTES # BLD: 0.5 K/UL (ref 0–1)
MONOCYTES NFR BLD: 3 %
NEUTROPHILS # BLD AUTO: 12.6 K/UL (ref 1.8–7.7)
NEUTROPHILS NFR BLD: 79 %
NEUTS BAND NFR BLD: 2 %
OSMOLALITY UR CALC.SUM OF ELEC: 282 MOSM/KG (ref 275–295)
PLATELET # BLD AUTO: 254 K/UL (ref 140–400)
PMV BLD AUTO: 9.1 FL (ref 7.4–10.3)
POTASSIUM SERPL-SCNC: 3.7 MMOL/L (ref 3.3–5.1)
PROT 24H UR-MRATE: 54.6 MG/24H (ref 0–150)
RBC # BLD AUTO: 3.89 M/UL (ref 3.7–5.4)
SODIUM SERPL-SCNC: 136 MMOL/L (ref 136–144)
SPECIMEN VOL 24H UR: 780 ML/24H
WBC # BLD AUTO: 15.6 K/UL (ref 4–11)

## 2018-03-27 PROCEDURE — 99217 OBSERVATION CARE DISCHARGE: CPT | Performed by: HOSPITALIST

## 2018-03-27 RX ORDER — MAGNESIUM OXIDE 400 MG (241.3 MG MAGNESIUM) TABLET
400 TABLET ONCE
Status: COMPLETED | OUTPATIENT
Start: 2018-03-27 | End: 2018-03-27

## 2018-03-27 NOTE — DISCHARGE SUMMARY
More than 30 min spent on dc  Dc summary # [id number]  Hospital Discharge Diagnoses: acute renalfailure    Lace+ Score: 23  59-90 High Risk  29-58 Medium Risk  0-28   Low Risk.     TCM Follow-Up Recommendation:  LACE < 29: Low Risk of readmission after discha

## 2018-03-27 NOTE — PLAN OF CARE
Problem: Patient/Family Goals  Goal: Patient/Family Long Term Goal  Patient's Long Term Goal:  Advance diet, increased strenghth    Interventions:  - electrolyte replacment  Routine labs  Clear liquid diet    - See additional Care Plan goals for specific i non-pharmacological measures as appropriate and evaluate response  - Consider cultural and social influences on pain and pain management  - Manage/alleviate anxiety  - Utilize distraction and/or relaxation techniques  - Monitor for opioid side effects  - N preferences of patient/family/discharge partner  - Complete POLST form as appropriate  - Assess patient's ability to be responsible for managing their own health  - Refer to Case Management Department for coordinating discharge planning if the patient need

## 2018-03-27 NOTE — PLAN OF CARE
Problem: Patient/Family Goals  Goal: Patient/Family Long Term Goal  Patient's Long Term Goal:  Advance diet, increased strenghth    Interventions:  - electrolyte replacment  Routine labs  Clear liquid diet    - See additional Care Plan goals for specific i cultural backgrounds into the planning and delivery of care  - Encourage patient/family to participate in care and decision-making at the level they choose  - Honor patient and family perspectives and choices   Outcome: Progressing  Reports \"feeling stron with appropriate resources  INTERVENTIONS:  - Identify barriers to discharge w/pt and caregiver  - Include patient/family/discharge partner in discharge planning  - Arrange for needed discharge resources and transportation as appropriate  - Identify discha

## 2018-03-28 ENCOUNTER — TELEPHONE (OUTPATIENT)
Dept: FAMILY MEDICINE CLINIC | Facility: CLINIC | Age: 63
End: 2018-03-28

## 2018-03-28 NOTE — TELEPHONE ENCOUNTER
Pt requesting a hospital f/u with Dr Judah Cid within the next week  I informed pt  will not be back until the week of April 9th  I offered another physician sooner   Pt states to see if it is ok to wait till the 9th and if Judah Cid will see her   If not then she

## 2018-03-28 NOTE — TELEPHONE ENCOUNTER
Pt returned call, reviewed Dorrine Payment APN recommendations as noted below. Pt asked if she could schedule appt sooner than 3/10/8 as already scheduled. Appt rescheduled to 3/29/18.

## 2018-03-28 NOTE — TELEPHONE ENCOUNTER
Pt called in requesting to have Katelin Shabbir complete forms for her to return to work. Pt is having her forms faxed to the office. Pt has an appointment scheduled for 3/30 with CAITLIN Cruz.

## 2018-03-29 ENCOUNTER — APPOINTMENT (OUTPATIENT)
Dept: LAB | Age: 63
End: 2018-03-29
Attending: NURSE PRACTITIONER
Payer: COMMERCIAL

## 2018-03-29 ENCOUNTER — OFFICE VISIT (OUTPATIENT)
Dept: FAMILY MEDICINE CLINIC | Facility: CLINIC | Age: 63
End: 2018-03-29

## 2018-03-29 VITALS
DIASTOLIC BLOOD PRESSURE: 72 MMHG | HEART RATE: 105 BPM | SYSTOLIC BLOOD PRESSURE: 107 MMHG | BODY MASS INDEX: 40 KG/M2 | WEIGHT: 253 LBS

## 2018-03-29 DIAGNOSIS — E87.6 HYPOKALEMIA: ICD-10-CM

## 2018-03-29 DIAGNOSIS — R60.0 PEDAL EDEMA: ICD-10-CM

## 2018-03-29 DIAGNOSIS — K52.9 GASTROENTERITIS: ICD-10-CM

## 2018-03-29 DIAGNOSIS — N17.9 ACUTE RENAL FAILURE, UNSPECIFIED ACUTE RENAL FAILURE TYPE (HCC): Primary | ICD-10-CM

## 2018-03-29 DIAGNOSIS — E83.42 HYPOMAGNESEMIA: ICD-10-CM

## 2018-03-29 DIAGNOSIS — N17.9 ACUTE RENAL FAILURE, UNSPECIFIED ACUTE RENAL FAILURE TYPE (HCC): ICD-10-CM

## 2018-03-29 PROCEDURE — 80053 COMPREHEN METABOLIC PANEL: CPT

## 2018-03-29 PROCEDURE — 85027 COMPLETE CBC AUTOMATED: CPT

## 2018-03-29 PROCEDURE — 36415 COLL VENOUS BLD VENIPUNCTURE: CPT

## 2018-03-29 PROCEDURE — 99214 OFFICE O/P EST MOD 30 MIN: CPT | Performed by: NURSE PRACTITIONER

## 2018-03-29 PROCEDURE — 83735 ASSAY OF MAGNESIUM: CPT

## 2018-03-29 NOTE — PROGRESS NOTES
HPI  Pt here for f/u hospital d/c for dehydration and acute kidney failure. Pt is c/o feeling very fatigued and weak. Has bilat feet swelling-was taken off chlorthalidone in hospital due to kidney failure. Denies vomitting, diarrhea, chest pain.  Has Main Topics   Smoking status: Former Smoker     Quit date: 1/11/2012    Smokeless tobacco: Former User    Alcohol use Yes  0.0 oz/week     Comment: rarely    Drug use: No    Sexual activity: Not on file     Other Topics Concern    Caffeine Concern Yes    C unknown cause. We will see how the patient does. Patient able to eat, doing well right now. 2.       Acute renal failure, creatinine 2, now down to 1.35. Hopefully, will continue to improve.   3.       Severe hypokalemia with several-hundred milliequiva visualized thoracolumbar spine. OTHER:             Negative.       =====  CONCLUSION: No acute cardiopulmonary abnormality. Physical Exam   Constitutional: She is oriented to person, place, and time. She appears well-developed and well-nourished.  Sh Metabolic Panel (14) [E]      Magnesium  None  Encouraged to sign up for My Chart if not already registered.

## 2018-03-29 NOTE — DISCHARGE SUMMARY
[facility]    PATIENT'S NAME: Shy Greco   ATTENDING PHYSICIAN: Ramona Liz MD   PATIENT ACCOUNT#:   [id number]    LOCATION:  [address] [place] [address] RECORD #:   [id number]       YOB: 1955  ADMISSION DATE:       03/25/ 38.06.  May need TACHO workup. 5.   Reflux disease. Continue Protonix. CONDITION UPON DISCHARGE:  Stable. CODE STATUS:  Full Code. DIET:  Low salt. Try to avoid milk. ACTIVITY:  As tolerated. FOLLOWUP:  Dr. Jaky Quinteros in 3 days.   Followup labs wh

## 2018-03-30 NOTE — TELEPHONE ENCOUNTER
We have not receive forms yet.  appointment with ANP IGOR was cancelled   Please call pt and reschedule appt

## 2018-04-03 ENCOUNTER — OFFICE VISIT (OUTPATIENT)
Dept: FAMILY MEDICINE CLINIC | Facility: CLINIC | Age: 63
End: 2018-04-03

## 2018-04-03 ENCOUNTER — HOSPITAL ENCOUNTER (OUTPATIENT)
Dept: GENERAL RADIOLOGY | Age: 63
Discharge: HOME | End: 2018-04-03
Attending: NURSE PRACTITIONER
Payer: COMMERCIAL

## 2018-04-03 ENCOUNTER — LAB ENCOUNTER (OUTPATIENT)
Dept: LAB | Age: 63
End: 2018-04-03
Attending: NURSE PRACTITIONER
Payer: COMMERCIAL

## 2018-04-03 VITALS
HEIGHT: 67 IN | HEART RATE: 97 BPM | BODY MASS INDEX: 38.92 KG/M2 | SYSTOLIC BLOOD PRESSURE: 141 MMHG | WEIGHT: 248 LBS | DIASTOLIC BLOOD PRESSURE: 86 MMHG

## 2018-04-03 DIAGNOSIS — R60.0 PEDAL EDEMA: ICD-10-CM

## 2018-04-03 DIAGNOSIS — R06.02 SHORTNESS OF BREATH: ICD-10-CM

## 2018-04-03 DIAGNOSIS — D72.829 LEUKOCYTOSIS, UNSPECIFIED TYPE: ICD-10-CM

## 2018-04-03 DIAGNOSIS — I10 ESSENTIAL HYPERTENSION: ICD-10-CM

## 2018-04-03 DIAGNOSIS — E83.42 HYPOMAGNESEMIA: ICD-10-CM

## 2018-04-03 DIAGNOSIS — R06.02 SHORTNESS OF BREATH: Primary | ICD-10-CM

## 2018-04-03 PROCEDURE — 36415 COLL VENOUS BLD VENIPUNCTURE: CPT

## 2018-04-03 PROCEDURE — 99214 OFFICE O/P EST MOD 30 MIN: CPT | Performed by: NURSE PRACTITIONER

## 2018-04-03 PROCEDURE — 83735 ASSAY OF MAGNESIUM: CPT

## 2018-04-03 PROCEDURE — 71046 X-RAY EXAM CHEST 2 VIEWS: CPT | Performed by: NURSE PRACTITIONER

## 2018-04-03 PROCEDURE — 85025 COMPLETE CBC W/AUTO DIFF WBC: CPT

## 2018-04-03 PROCEDURE — 80053 COMPREHEN METABOLIC PANEL: CPT

## 2018-04-03 RX ORDER — LEVOFLOXACIN 500 MG/1
500 TABLET, FILM COATED ORAL DAILY
Qty: 10 TABLET | Refills: 0 | Status: SHIPPED | OUTPATIENT
Start: 2018-04-03 | End: 2018-04-13

## 2018-04-03 RX ORDER — ALBUTEROL SULFATE 90 UG/1
2 AEROSOL, METERED RESPIRATORY (INHALATION) EVERY 4 HOURS PRN
Qty: 18 G | Refills: 5 | Status: SHIPPED | OUTPATIENT
Start: 2018-04-03 | End: 2018-07-12

## 2018-04-03 NOTE — PROGRESS NOTES
HPI  Pt here for f/u hospitalization. Still feeling pretty weak and is SOB with any exertion. Needs FMLA papers completed. Feels like she is wheezing at night. Has coughing spells when she is short of breath.   Swelling in feet is improving-has been elev Comment: Coffee, 1 cup daily     Social History Narrative   None on file         Current Outpatient Prescriptions:  Albuterol Sulfate  (90 Base) MCG/ACT Inhalation Aero Soln Inhale 2 puffs into the lungs every 4 (four) hours as needed for Wheezing Neck: Normal range of motion. Neck supple. No thyromegaly present. Cardiovascular: Normal rate, regular rhythm and normal heart sounds. No murmur heard. 2+ pedal edema bilat.     Pulmonary/Chest: Effort normal and breath sounds normal. No respiratory

## 2018-04-03 NOTE — PATIENT INSTRUCTIONS
Using an Inhaler with a Spacer  To control asthma, you need to use your medicines the right way. Some medicines are inhaled using a device called a metered-dose inhaler (MDI). Metered-dose inhalers deliver medicine with a fine spray.  You may be asked to

## 2018-04-04 ENCOUNTER — TELEPHONE (OUTPATIENT)
Dept: OTHER | Age: 63
End: 2018-04-04

## 2018-04-04 DIAGNOSIS — E83.42 HYPOMAGNESEMIA: ICD-10-CM

## 2018-04-04 DIAGNOSIS — J18.9 PNEUMONIA DUE TO INFECTIOUS ORGANISM, UNSPECIFIED LATERALITY, UNSPECIFIED PART OF LUNG: ICD-10-CM

## 2018-04-04 DIAGNOSIS — E87.6 HYPOKALEMIA: Primary | ICD-10-CM

## 2018-04-04 RX ORDER — POTASSIUM CHLORIDE 750 MG/1
10 TABLET, FILM COATED, EXTENDED RELEASE ORAL DAILY
Qty: 30 TABLET | Refills: 0 | Status: SHIPPED | OUTPATIENT
Start: 2018-04-04 | End: 2018-07-12

## 2018-04-04 NOTE — TELEPHONE ENCOUNTER
----- Message from CAITLIN Chavarria, HAWA-CRISTEL sent at 4/4/2018 11:12 AM CDT -----  Your numbers are improving but your potassium and magnesium remain low. I will send in a prescription for Kdur 10 meq once a day and magnesium 400 mg I cap once a day.   Sonia Jordan

## 2018-04-04 NOTE — TELEPHONE ENCOUNTER
Caden Diaz. APN for Dr Jameel Sanford,    Pt inquiring if short term disability form was completed. Dropped off form in [employer] 4/3/18. Please respond to pool: EM  [employer] LPN/CMA - call pt when completed.

## 2018-04-04 NOTE — TELEPHONE ENCOUNTER
Reviewed lab results with pt along with Eddie Damon. APN recommendations. Pt verbalized understanding and agreed with plan.

## 2018-04-04 NOTE — TELEPHONE ENCOUNTER
Reviewed lab results along with Nick Galindo. APN recommendations. Advised pt if s/sx worsen go to ED for md evaluation. Pt verbalized understanding and agreed with plan.

## 2018-04-04 NOTE — TELEPHONE ENCOUNTER
----- Message from CAITLIN Dunn FNP-CRISTEL sent at 4/3/2018  4:39 PM CDT -----  Xray shows some change in the retrosternal airspace that could be pneumonia. This is a new finding.  I recommend taking anbx (this would explain the abnormal CBC and SOB)

## 2018-04-09 NOTE — TELEPHONE ENCOUNTER
Message routed to [facility]. [address] [facility] location.

## 2018-04-10 ENCOUNTER — TELEPHONE (OUTPATIENT)
Dept: ADMINISTRATIVE | Age: 63
End: 2018-04-10

## 2018-04-10 NOTE — TELEPHONE ENCOUNTER
Good afternoon Briana Tang,    JOSE and Short term disability forms pending in [place]. Pt has been off work since 3/22/18 and she stated that she is still on meds and will be getting labs done around 4/18/18 and will be following up with you afterwards.  Is it ok to

## 2018-04-11 NOTE — TELEPHONE ENCOUNTER
Spoke to pt and she will come to [facility] [place] office by FM pod around 11 am on Fri to  both FMLA and Disab forms to fill out her portions and take to her work. Pt will also need to sign a HIPAA release.

## 2018-04-11 NOTE — TELEPHONE ENCOUNTER
Good afternoon Sarah White,     Please sign off on 2 forms:  -Highlight the patient and hit \"Chart\" button. -In Chart Review, w/in the Encounter tab - click 1 time on the Telephone call encounter for 4/10/18.  Scroll down the telephone encounter.  -Click Nikki Spears

## 2018-04-13 NOTE — TELEPHONE ENCOUNTER
Pt signed HIPAA and FCR (will be billed) at [facility]. Picked up FMLA and Disab paperwork with copies.

## 2018-04-18 ENCOUNTER — APPOINTMENT (OUTPATIENT)
Dept: LAB | Age: 63
End: 2018-04-18
Attending: NURSE PRACTITIONER
Payer: COMMERCIAL

## 2018-04-18 DIAGNOSIS — E83.42 HYPOMAGNESEMIA: ICD-10-CM

## 2018-04-18 DIAGNOSIS — E87.6 HYPOKALEMIA: ICD-10-CM

## 2018-04-18 DIAGNOSIS — J18.9 PNEUMONIA DUE TO INFECTIOUS ORGANISM, UNSPECIFIED LATERALITY, UNSPECIFIED PART OF LUNG: ICD-10-CM

## 2018-04-18 PROCEDURE — 85027 COMPLETE CBC AUTOMATED: CPT

## 2018-04-18 PROCEDURE — 83735 ASSAY OF MAGNESIUM: CPT

## 2018-04-18 PROCEDURE — 36415 COLL VENOUS BLD VENIPUNCTURE: CPT

## 2018-04-18 PROCEDURE — 80053 COMPREHEN METABOLIC PANEL: CPT

## 2018-04-19 ENCOUNTER — TELEPHONE (OUTPATIENT)
Dept: FAMILY MEDICINE CLINIC | Facility: CLINIC | Age: 63
End: 2018-04-19

## 2018-04-19 DIAGNOSIS — R74.8 ELEVATED LIVER ENZYMES: Primary | ICD-10-CM

## 2018-04-19 NOTE — TELEPHONE ENCOUNTER
May return to work-no restirictons. Letter signed. Placed at . Please call pt that letters are ready for .

## 2018-04-19 NOTE — TELEPHONE ENCOUNTER
Spoke with patient (identified name and ), results reviewed and agrees with plan. Jaja Noble, patient reports she is taking prescription for magnesium 400mg once a day, she tends to eat a lot of fruit, but avoid sweets/white bread and pasta.  Advised her to

## 2018-04-19 NOTE — TELEPHONE ENCOUNTER
Notes recorded by CAITLIN Jensen, FNDARCY-C on 4/19/2018 at 8:18 AM CDT  Your magnesium levels remain low-are you taking a magnesium supplement?  Your blood sugars are running a little high, please make sure you are watching the amount of bread, rice,

## 2018-04-27 ENCOUNTER — HOSPITAL ENCOUNTER (OUTPATIENT)
Dept: ULTRASOUND IMAGING | Age: 63
Discharge: HOME | End: 2018-04-27
Attending: NURSE PRACTITIONER
Payer: COMMERCIAL

## 2018-04-27 DIAGNOSIS — R74.8 ELEVATED LIVER ENZYMES: ICD-10-CM

## 2018-04-27 PROCEDURE — 76700 US EXAM ABDOM COMPLETE: CPT | Performed by: NURSE PRACTITIONER

## 2018-07-12 ENCOUNTER — LAB ENCOUNTER (OUTPATIENT)
Dept: LAB | Age: 63
End: 2018-07-12
Attending: FAMILY MEDICINE
Payer: COMMERCIAL

## 2018-07-12 ENCOUNTER — OFFICE VISIT (OUTPATIENT)
Dept: FAMILY MEDICINE CLINIC | Facility: CLINIC | Age: 63
End: 2018-07-12

## 2018-07-12 VITALS
HEART RATE: 82 BPM | BODY MASS INDEX: 35.47 KG/M2 | HEIGHT: 67 IN | WEIGHT: 226 LBS | DIASTOLIC BLOOD PRESSURE: 78 MMHG | SYSTOLIC BLOOD PRESSURE: 113 MMHG

## 2018-07-12 DIAGNOSIS — Z12.31 SCREENING MAMMOGRAM, ENCOUNTER FOR: ICD-10-CM

## 2018-07-12 DIAGNOSIS — Z78.0 POST-MENOPAUSAL: ICD-10-CM

## 2018-07-12 DIAGNOSIS — L60.0 INGROWN NAIL: ICD-10-CM

## 2018-07-12 DIAGNOSIS — I10 ESSENTIAL HYPERTENSION: Primary | ICD-10-CM

## 2018-07-12 DIAGNOSIS — I10 ESSENTIAL HYPERTENSION: ICD-10-CM

## 2018-07-12 DIAGNOSIS — Z12.11 SCREEN FOR COLON CANCER: ICD-10-CM

## 2018-07-12 PROBLEM — E86.0 DEHYDRATION: Status: RESOLVED | Noted: 2018-03-25 | Resolved: 2018-07-12

## 2018-07-12 PROBLEM — E83.42 HYPOMAGNESEMIA: Status: RESOLVED | Noted: 2018-04-03 | Resolved: 2018-07-12

## 2018-07-12 PROBLEM — R60.0 PEDAL EDEMA: Status: RESOLVED | Noted: 2018-04-03 | Resolved: 2018-07-12

## 2018-07-12 PROBLEM — D72.829 ELEVATED WHITE BLOOD CELL COUNT, UNSPECIFIED: Status: RESOLVED | Noted: 2018-04-03 | Resolved: 2018-07-12

## 2018-07-12 PROBLEM — R06.02 SHORTNESS OF BREATH: Status: RESOLVED | Noted: 2018-04-03 | Resolved: 2018-07-12

## 2018-07-12 PROBLEM — E87.6 HYPOKALEMIA: Status: RESOLVED | Noted: 2018-03-25 | Resolved: 2018-07-12

## 2018-07-12 LAB
ALBUMIN SERPL BCP-MCNC: 3.6 G/DL (ref 3.5–4.8)
ALBUMIN/GLOB SERPL: 1.1 {RATIO} (ref 1–2)
ALP SERPL-CCNC: 69 U/L (ref 32–100)
ALT SERPL-CCNC: 22 U/L (ref 14–54)
ANION GAP SERPL CALC-SCNC: 8 MMOL/L (ref 0–18)
AST SERPL-CCNC: 20 U/L (ref 15–41)
BASOPHILS # BLD: 0.1 K/UL (ref 0–0.2)
BASOPHILS NFR BLD: 1 %
BILIRUB SERPL-MCNC: 0.7 MG/DL (ref 0.3–1.2)
BUN SERPL-MCNC: 19 MG/DL (ref 8–20)
BUN/CREAT SERPL: 23.8 (ref 10–20)
CALCIUM SERPL-MCNC: 9.3 MG/DL (ref 8.5–10.5)
CHLORIDE SERPL-SCNC: 102 MMOL/L (ref 95–110)
CHOLEST SERPL-MCNC: 170 MG/DL (ref 110–200)
CO2 SERPL-SCNC: 29 MMOL/L (ref 22–32)
CREAT SERPL-MCNC: 0.8 MG/DL (ref 0.5–1.5)
EOSINOPHIL # BLD: 0.1 K/UL (ref 0–0.7)
EOSINOPHIL NFR BLD: 2 %
ERYTHROCYTE [DISTWIDTH] IN BLOOD BY AUTOMATED COUNT: 13.4 % (ref 11–15)
GLOBULIN PLAS-MCNC: 3.3 G/DL (ref 2.5–3.7)
GLUCOSE SERPL-MCNC: 99 MG/DL (ref 70–99)
HCT VFR BLD AUTO: 39.3 % (ref 35–48)
HDLC SERPL-MCNC: 46 MG/DL
HGB BLD-MCNC: 13.2 G/DL (ref 12–16)
LDLC SERPL CALC-MCNC: 104 MG/DL (ref 0–99)
LYMPHOCYTES # BLD: 2.4 K/UL (ref 1–4)
LYMPHOCYTES NFR BLD: 27 %
MCH RBC QN AUTO: 28.5 PG (ref 27–32)
MCHC RBC AUTO-ENTMCNC: 33.5 G/DL (ref 32–37)
MCV RBC AUTO: 85.1 FL (ref 80–100)
MONOCYTES # BLD: 0.6 K/UL (ref 0–1)
MONOCYTES NFR BLD: 7 %
NEUTROPHILS # BLD AUTO: 5.7 K/UL (ref 1.8–7.7)
NEUTROPHILS NFR BLD: 64 %
NONHDLC SERPL-MCNC: 124 MG/DL
OSMOLALITY UR CALC.SUM OF ELEC: 290 MOSM/KG (ref 275–295)
PATIENT FASTING: NO
PLATELET # BLD AUTO: 242 K/UL (ref 140–400)
PMV BLD AUTO: 10.3 FL (ref 7.4–10.3)
POTASSIUM SERPL-SCNC: 3.1 MMOL/L (ref 3.3–5.1)
PROT SERPL-MCNC: 6.9 G/DL (ref 5.9–8.4)
RBC # BLD AUTO: 4.62 M/UL (ref 3.7–5.4)
SODIUM SERPL-SCNC: 139 MMOL/L (ref 136–144)
TRIGL SERPL-MCNC: 100 MG/DL (ref 1–149)
TSH SERPL-ACNC: 0.72 UIU/ML (ref 0.45–5.33)
WBC # BLD AUTO: 9 K/UL (ref 4–11)

## 2018-07-12 PROCEDURE — 85025 COMPLETE CBC W/AUTO DIFF WBC: CPT

## 2018-07-12 PROCEDURE — 80061 LIPID PANEL: CPT

## 2018-07-12 PROCEDURE — 84443 ASSAY THYROID STIM HORMONE: CPT

## 2018-07-12 PROCEDURE — 82306 VITAMIN D 25 HYDROXY: CPT

## 2018-07-12 PROCEDURE — 80053 COMPREHEN METABOLIC PANEL: CPT

## 2018-07-12 PROCEDURE — 99214 OFFICE O/P EST MOD 30 MIN: CPT | Performed by: FAMILY MEDICINE

## 2018-07-12 PROCEDURE — 36415 COLL VENOUS BLD VENIPUNCTURE: CPT

## 2018-07-12 PROCEDURE — 99212 OFFICE O/P EST SF 10 MIN: CPT | Performed by: FAMILY MEDICINE

## 2018-07-12 RX ORDER — LOSARTAN POTASSIUM 50 MG/1
50 TABLET ORAL DAILY
Qty: 90 TABLET | Refills: 4 | Status: SHIPPED | OUTPATIENT
Start: 2018-07-12 | End: 2019-10-01

## 2018-07-12 RX ORDER — HYDROCODONE BITARTRATE AND ACETAMINOPHEN 5; 325 MG/1; MG/1
TABLET ORAL
Refills: 0 | COMMUNITY
Start: 2018-05-18 | End: 2018-07-12

## 2018-07-12 RX ORDER — CHLORTHALIDONE 25 MG/1
25 TABLET ORAL DAILY
Qty: 90 TABLET | Refills: 4 | Status: SHIPPED | OUTPATIENT
Start: 2018-07-12 | End: 2019-10-01

## 2018-07-12 NOTE — PROGRESS NOTES
Marilyn Blackwood is a 65year old female. Patient presents with:  Hypertension    HPI:   Pt here for regular follow up. Had bad GI infection few months ago and needed hospitalization. Lost some weight with it and has not put it back on. Feeling ok otherwise. Continue current medications   - COMP METABOLIC PANEL (14); Future  - CBC WITH DIFFERENTIAL WITH PLATELET; Future  - LIPID PANEL; Future  - TSH W REFLEX TO FREE T4; Future  - VITAMIN D, 25-HYDROXY; Future    2.  Screening mammogram, encounter for    - NICHOLAS JOSÉ

## 2018-07-13 LAB — 25(OH)D3 SERPL-MCNC: 32.2 NG/ML

## 2018-07-18 NOTE — PROGRESS NOTES
Tests are all normal except low potassium levels.  Please start taking daily potassium with the blood pressure medications. - Dr. Scott Rivas

## 2018-08-06 ENCOUNTER — HOSPITAL ENCOUNTER (OUTPATIENT)
Dept: MAMMOGRAPHY | Age: 63
Discharge: HOME | End: 2018-08-06
Attending: FAMILY MEDICINE
Payer: COMMERCIAL

## 2018-08-06 ENCOUNTER — HOSPITAL ENCOUNTER (OUTPATIENT)
Dept: BONE DENSITY | Age: 63
Discharge: HOME | End: 2018-08-06
Attending: FAMILY MEDICINE
Payer: COMMERCIAL

## 2018-08-06 ENCOUNTER — OFFICE VISIT (OUTPATIENT)
Dept: PODIATRY CLINIC | Facility: CLINIC | Age: 63
End: 2018-08-06

## 2018-08-06 VITALS — WEIGHT: 226 LBS | BODY MASS INDEX: 35.47 KG/M2 | HEIGHT: 67 IN

## 2018-08-06 DIAGNOSIS — B35.1 ONYCHOMYCOSIS: ICD-10-CM

## 2018-08-06 DIAGNOSIS — Z78.0 POST-MENOPAUSAL: ICD-10-CM

## 2018-08-06 DIAGNOSIS — M79.674 PAIN OF TOE OF RIGHT FOOT: Primary | ICD-10-CM

## 2018-08-06 DIAGNOSIS — Z12.31 SCREENING MAMMOGRAM, ENCOUNTER FOR: ICD-10-CM

## 2018-08-06 PROCEDURE — 99212 OFFICE O/P EST SF 10 MIN: CPT | Performed by: PODIATRIST

## 2018-08-06 PROCEDURE — 77080 DXA BONE DENSITY AXIAL: CPT | Performed by: FAMILY MEDICINE

## 2018-08-06 PROCEDURE — 77067 SCR MAMMO BI INCL CAD: CPT | Performed by: FAMILY MEDICINE

## 2018-08-06 NOTE — PROGRESS NOTES
HPI:    Patient ID: Debbie Jea nBaptiste is a 65year old female. HPI  This pleasant 72-year-old is to me with a new concerns. She is having pain and frustration with the right great toenail.   She is aware of a change in its appearance, thickness, and some p Referrals:  None       #[id number]

## 2018-08-07 ENCOUNTER — TELEPHONE (OUTPATIENT)
Dept: OTHER | Age: 63
End: 2018-08-07

## 2018-08-07 NOTE — TELEPHONE ENCOUNTER
Per radiology protocol: The patient came in for a mammogram which she requested herself. She needs additional views which they will call the patient back with today. They need Dr. Ronald Bell to sign off on the orders which they placed in the EPIC system.

## 2018-08-13 ENCOUNTER — TELEPHONE (OUTPATIENT)
Dept: OTHER | Age: 63
End: 2018-08-13

## 2018-08-13 NOTE — PROGRESS NOTES
Dexascan shows mild bone loss.  Please make sure and take calcium 500 mg twice a day and vitamin D 2000 units daily and regular exercise to help increase your bone strength. -Dr. Bassett End

## 2018-08-13 NOTE — TELEPHONE ENCOUNTER
Advised patient of Dr. Dolores Delgado note. Patient verbalized understanding.   Patient states someone already called her from [facility] about her results and advised her she would need additional right breast US and has an appt Wednesday at 10:40am. Dr. Melina Tracy

## 2018-08-13 NOTE — TELEPHONE ENCOUNTER
----- Message from Adry Rosenbaum MD sent at 8/13/2018  3:28 PM CDT -----  Patient needs additional mammogram and US imaging of right breast. Please explain to patient.  Dx abnormal mammogram

## 2018-08-13 NOTE — PROGRESS NOTES
Patient needs additional mammogram and US imaging of right breast. Please explain to patient.  Dx abnormal mammogram

## 2018-08-14 NOTE — TELEPHONE ENCOUNTER
Patient is scheduled tomorrow 8/15/18 for the mammogram. Order for NICHOLAS DIAGNOSTIC ADDL VWS RIGHT ([id number]) (Myrna Duron) [[id number]] (Order [id number]) in chart.  Advised patient to check with insurance if need for authorization of diagnostic mammogram; she said s

## 2018-08-15 ENCOUNTER — HOSPITAL ENCOUNTER (OUTPATIENT)
Dept: MAMMOGRAPHY | Facility: HOSPITAL | Age: 63
Discharge: HOME | End: 2018-08-15
Attending: FAMILY MEDICINE
Payer: COMMERCIAL

## 2018-08-15 ENCOUNTER — HOSPITAL ENCOUNTER (OUTPATIENT)
Dept: ULTRASOUND IMAGING | Facility: HOSPITAL | Age: 63
Discharge: HOME | End: 2018-08-15
Attending: FAMILY MEDICINE
Payer: COMMERCIAL

## 2018-08-15 DIAGNOSIS — R92.8 ABNORMAL MAMMOGRAM: ICD-10-CM

## 2018-08-15 DIAGNOSIS — N63.0 BREAST MASS: Primary | ICD-10-CM

## 2018-08-15 PROCEDURE — 76642 ULTRASOUND BREAST LIMITED: CPT | Performed by: FAMILY MEDICINE

## 2018-08-15 PROCEDURE — 77065 DX MAMMO INCL CAD UNI: CPT | Performed by: FAMILY MEDICINE

## 2018-08-15 PROCEDURE — 77061 BREAST TOMOSYNTHESIS UNI: CPT | Performed by: FAMILY MEDICINE

## 2018-08-15 NOTE — TELEPHONE ENCOUNTER
Pt was called and she stated that she insurance just informed her she just needs a referral. She will be going today to have it done.  Thanks

## 2018-08-15 NOTE — PROGRESS NOTES
No changes to the right breast lesion.  Radiologist wants to repeat imaging in 6 months again to make sure stable. - Dr. Demetrius Peoples

## 2018-08-18 ENCOUNTER — LAB ENCOUNTER (OUTPATIENT)
Dept: LAB | Age: 63
End: 2018-08-18
Attending: FAMILY MEDICINE
Payer: COMMERCIAL

## 2018-08-18 DIAGNOSIS — Z12.11 SCREEN FOR COLON CANCER: ICD-10-CM

## 2018-08-18 LAB — HEMOCCULT STL QL: NEGATIVE

## 2018-08-18 PROCEDURE — 82274 ASSAY TEST FOR BLOOD FECAL: CPT

## 2018-08-20 ENCOUNTER — TELEPHONE (OUTPATIENT)
Dept: OTHER | Age: 63
End: 2018-08-20

## 2018-08-20 NOTE — TELEPHONE ENCOUNTER
----- Message from Kurtis Turner MD sent at 8/15/2018  6:27 PM CDT -----  No changes to the right breast lesion.  Radiologist wants to repeat imaging in 6 months again to make sure stable. - Dr. Haven Puckett

## 2018-09-10 ENCOUNTER — OFFICE VISIT (OUTPATIENT)
Dept: PODIATRY CLINIC | Facility: CLINIC | Age: 63
End: 2018-09-10

## 2018-09-10 DIAGNOSIS — B35.1 ONYCHOMYCOSIS: Primary | ICD-10-CM

## 2018-09-10 PROCEDURE — 99212 OFFICE O/P EST SF 10 MIN: CPT | Performed by: PODIATRIST

## 2018-09-10 NOTE — PROGRESS NOTES
HPI:    Patient ID: Sabina Moscoso is a 65year old female. Toe Pain     This patient presents for the purpose of fungal culture particularly of her right toenail. I again reviewed the treatment plan and the reason for this culture.   Patient is well-in

## 2018-10-22 ENCOUNTER — TELEPHONE (OUTPATIENT)
Dept: ORTHOPEDICS CLINIC | Facility: CLINIC | Age: 63
End: 2018-10-22

## 2018-10-22 NOTE — TELEPHONE ENCOUNTER
Nail culture result  Call to [facility]. No answer. Left voice message REquested call back for an appointment with Dr. Chela Block.

## 2019-01-12 RX ORDER — RANITIDINE 300 MG/1
TABLET ORAL
Qty: 45 TABLET | Refills: 0 | Status: SHIPPED | OUTPATIENT
Start: 2019-01-12 | End: 2019-05-10

## 2019-05-10 NOTE — TELEPHONE ENCOUNTER
Dr Garrison Gallego protocol pended for approval due to patient's request.if agrees, please change SIG and quantity before signing.         5/10/19 4:22 PM   True Yu routed this conversation to [facility] Rn Triage      True Yu          5/10/19 4:19 PM

## 2019-05-10 NOTE — TELEPHONE ENCOUNTER
Pt called to f/u stating she would like dr to change rx to full tablet since she runs out quick.     Pt is out of rx please advise

## 2019-05-11 RX ORDER — RANITIDINE 300 MG/1
TABLET ORAL
Qty: 45 TABLET | Refills: 1 | Status: SHIPPED | OUTPATIENT
Start: 2019-05-11 | End: 2019-05-16

## 2019-05-11 NOTE — TELEPHONE ENCOUNTER
Refill passed per [facility], [facility] protocol.   Refill Protocol Appointment Criteria  · Appointment scheduled in the past 12 months or in the next 3 months  Recent Outpatient Visits            8 months ago Onychomycosis    [facility], [facility], [address], [address] [facility]

## 2019-05-11 NOTE — TELEPHONE ENCOUNTER
Pt called in stating that she needs to  this medication this weekend as she works 7am-11pm daily and then wouldn't be available to pick it up until next weekend. Please advise.

## 2019-05-15 ENCOUNTER — TELEPHONE (OUTPATIENT)
Dept: FAMILY MEDICINE CLINIC | Facility: CLINIC | Age: 64
End: 2019-05-15

## 2019-05-15 NOTE — TELEPHONE ENCOUNTER
Patient requesting dose for medication below to be changed to 1 Tablet daily.      RANITIDINE  MG Oral Tab TAKE HALF TABLET BY MOUTH NIGHTLY Disp: 45 tablet Rfl: 1

## 2019-05-15 NOTE — TELEPHONE ENCOUNTER
Pt calling back stating the prescribed 1/2 tab of Ranitidine doesn't work as the heartburn starts up towards the late afternoon. Pt requesting 1 tab a day instead. Please advise.

## 2019-05-16 RX ORDER — RANITIDINE 300 MG/1
300 TABLET ORAL NIGHTLY
Qty: 90 TABLET | Refills: 1 | Status: SHIPPED | OUTPATIENT
Start: 2019-05-16 | End: 2019-12-03

## 2019-05-16 NOTE — TELEPHONE ENCOUNTER
Pt informed of Dr Shelvy Prader message/recommendation below. Pt voiced understanding and agrees. Advised Ranitidine 300mg eRX sent to [address].

## 2019-06-12 ENCOUNTER — TELEPHONE (OUTPATIENT)
Dept: FAMILY MEDICINE CLINIC | Facility: CLINIC | Age: 64
End: 2019-06-12

## 2019-06-12 NOTE — TELEPHONE ENCOUNTER
Patient is currently on Potassium Chloride tab 20mEq ER. Would generic alternatives be appropriate: POT CHLORIDE TAB (8MEQ ER; 10MEQ ER)?

## 2019-06-20 RX ORDER — POTASSIUM CHLORIDE 750 MG/1
20 TABLET, FILM COATED, EXTENDED RELEASE ORAL DAILY
Qty: 180 TABLET | Refills: 3 | Status: SHIPPED | OUTPATIENT
Start: 2019-06-20 | End: 2020-02-20

## 2019-06-20 NOTE — TELEPHONE ENCOUNTER
Patient contacted- she verbalized understanding.  Dr. Lai Marie- last sent 7/18/18 for 1 year supply (90 day at a time) how many refills did you want to send in?

## 2019-07-15 ENCOUNTER — OFFICE VISIT (OUTPATIENT)
Dept: FAMILY MEDICINE CLINIC | Facility: CLINIC | Age: 64
End: 2019-07-15
Payer: COMMERCIAL

## 2019-07-15 VITALS
DIASTOLIC BLOOD PRESSURE: 78 MMHG | BODY MASS INDEX: 38.42 KG/M2 | WEIGHT: 244.81 LBS | HEART RATE: 77 BPM | HEIGHT: 67 IN | SYSTOLIC BLOOD PRESSURE: 126 MMHG

## 2019-07-15 DIAGNOSIS — Z12.11 SCREEN FOR COLON CANCER: ICD-10-CM

## 2019-07-15 DIAGNOSIS — I10 ESSENTIAL HYPERTENSION: Primary | ICD-10-CM

## 2019-07-15 DIAGNOSIS — Z01.419 ROUTINE GYNECOLOGICAL EXAMINATION: ICD-10-CM

## 2019-07-15 DIAGNOSIS — Z12.31 SCREENING MAMMOGRAM, ENCOUNTER FOR: ICD-10-CM

## 2019-07-15 PROCEDURE — 99396 PREV VISIT EST AGE 40-64: CPT | Performed by: FAMILY MEDICINE

## 2019-07-15 NOTE — PROGRESS NOTES
HPI:   Theresa Sanabria is a 59year old female who presents for a complete physical exam.   No LMP recorded. Patient is postmenopausal.  Pt does not know why gaining weight. Reports eating the same. Still working 2 jobs.  Reports not eating meat - eats mostl quittin.5      Smokeless tobacco: Former User    Alcohol use:  Yes      Alcohol/week: 0.0 oz      Comment: rarely    Drug use: No    Exercise:  Diet:     REVIEW OF SYSTEMS:   GENERAL: feels well otherwise  SKIN: denies any unusual skin lesions  EYES:den REFLEX TO FREE T4; Future  - VITAMIN D, 25-HYDROXY; Future  - VITAMIN B12; Future    4. Screen for colon cancer    - GASTRO - INTERNAL    pelvic done. Order put in for mammogram.. Self breast exam explained. Health maintenance.  Pt' s weight is Body mass in

## 2019-07-20 ENCOUNTER — LAB ENCOUNTER (OUTPATIENT)
Dept: LAB | Age: 64
End: 2019-07-20
Attending: FAMILY MEDICINE
Payer: COMMERCIAL

## 2019-07-20 DIAGNOSIS — Z01.419 ROUTINE GYNECOLOGICAL EXAMINATION: ICD-10-CM

## 2019-07-20 LAB
ALBUMIN SERPL-MCNC: 3.4 G/DL (ref 3.4–5)
ALBUMIN/GLOB SERPL: 1 {RATIO} (ref 1–2)
ALP LIVER SERPL-CCNC: 77 U/L (ref 50–130)
ALT SERPL-CCNC: 32 U/L (ref 13–56)
ANION GAP SERPL CALC-SCNC: 7 MMOL/L (ref 0–18)
AST SERPL-CCNC: 19 U/L (ref 15–37)
BASOPHILS # BLD AUTO: 0.05 X10(3) UL (ref 0–0.2)
BASOPHILS NFR BLD AUTO: 0.7 %
BILIRUB SERPL-MCNC: 0.6 MG/DL (ref 0.1–2)
BUN BLD-MCNC: 27 MG/DL (ref 7–18)
BUN/CREAT SERPL: 29 (ref 10–20)
CALCIUM BLD-MCNC: 9.2 MG/DL (ref 8.5–10.1)
CHLORIDE SERPL-SCNC: 107 MMOL/L (ref 98–112)
CHOLEST SMN-MCNC: 182 MG/DL (ref ?–200)
CO2 SERPL-SCNC: 30 MMOL/L (ref 21–32)
CREAT BLD-MCNC: 0.93 MG/DL (ref 0.55–1.02)
DEPRECATED RDW RBC AUTO: 42.5 FL (ref 35.1–46.3)
EOSINOPHIL # BLD AUTO: 0.21 X10(3) UL (ref 0–0.7)
EOSINOPHIL NFR BLD AUTO: 3 %
ERYTHROCYTE [DISTWIDTH] IN BLOOD BY AUTOMATED COUNT: 13.2 % (ref 11–15)
EST. AVERAGE GLUCOSE BLD GHB EST-MCNC: 128 MG/DL (ref 68–126)
GLOBULIN PLAS-MCNC: 3.5 G/DL (ref 2.8–4.4)
GLUCOSE BLD-MCNC: 91 MG/DL (ref 70–99)
HBA1C MFR BLD HPLC: 6.1 % (ref ?–5.7)
HCT VFR BLD AUTO: 41.6 % (ref 35–48)
HDLC SERPL-MCNC: 49 MG/DL (ref 40–59)
HGB BLD-MCNC: 13.3 G/DL (ref 12–16)
IMM GRANULOCYTES # BLD AUTO: 0.03 X10(3) UL (ref 0–1)
IMM GRANULOCYTES NFR BLD: 0.4 %
LDLC SERPL CALC-MCNC: 108 MG/DL (ref ?–100)
LYMPHOCYTES # BLD AUTO: 2.37 X10(3) UL (ref 1–4)
LYMPHOCYTES NFR BLD AUTO: 33.9 %
M PROTEIN MFR SERPL ELPH: 6.9 G/DL (ref 6.4–8.2)
MCH RBC QN AUTO: 28.1 PG (ref 26–34)
MCHC RBC AUTO-ENTMCNC: 32 G/DL (ref 31–37)
MCV RBC AUTO: 87.8 FL (ref 80–100)
MONOCYTES # BLD AUTO: 0.64 X10(3) UL (ref 0.1–1)
MONOCYTES NFR BLD AUTO: 9.2 %
NEUTROPHILS # BLD AUTO: 3.69 X10 (3) UL (ref 1.5–7.7)
NEUTROPHILS # BLD AUTO: 3.69 X10(3) UL (ref 1.5–7.7)
NEUTROPHILS NFR BLD AUTO: 52.8 %
NONHDLC SERPL-MCNC: 133 MG/DL (ref ?–130)
OSMOLALITY SERPL CALC.SUM OF ELEC: 303 MOSM/KG (ref 275–295)
PATIENT FASTING: YES
PATIENT FASTING: YES
PLATELET # BLD AUTO: 274 10(3)UL (ref 150–450)
POTASSIUM SERPL-SCNC: 3.6 MMOL/L (ref 3.5–5.1)
RBC # BLD AUTO: 4.74 X10(6)UL (ref 3.8–5.3)
SODIUM SERPL-SCNC: 144 MMOL/L (ref 136–145)
TRIGL SERPL-MCNC: 124 MG/DL (ref 30–149)
TSI SER-ACNC: 0.89 MIU/ML (ref 0.36–3.74)
VIT B12 SERPL-MCNC: 405 PG/ML (ref 193–986)
VLDLC SERPL CALC-MCNC: 25 MG/DL (ref 0–30)
WBC # BLD AUTO: 7 X10(3) UL (ref 4–11)

## 2019-07-20 PROCEDURE — 84443 ASSAY THYROID STIM HORMONE: CPT

## 2019-07-20 PROCEDURE — 85025 COMPLETE CBC W/AUTO DIFF WBC: CPT

## 2019-07-20 PROCEDURE — 80053 COMPREHEN METABOLIC PANEL: CPT

## 2019-07-20 PROCEDURE — 82607 VITAMIN B-12: CPT

## 2019-07-20 PROCEDURE — 80061 LIPID PANEL: CPT

## 2019-07-20 PROCEDURE — 36415 COLL VENOUS BLD VENIPUNCTURE: CPT

## 2019-07-20 PROCEDURE — 83036 HEMOGLOBIN GLYCOSYLATED A1C: CPT

## 2019-07-20 PROCEDURE — 82306 VITAMIN D 25 HYDROXY: CPT

## 2019-07-22 LAB — 25(OH)D3 SERPL-MCNC: 24.6 NG/ML (ref 30–100)

## 2019-07-24 NOTE — PROGRESS NOTES
Jaquan Form - Vitamin D levels are slightly decreased, please take vitamin D 2000 units daily. Your fasting glucose was normal but you are in the range of pre-diabetes based on the hemoglobin A1C number.  As we discussed, less carbs and smaller meals and more wa

## 2019-07-25 NOTE — PROGRESS NOTES
[id number] Nannette Strickland If she is willing to take, I sent in script for metformin - it will help decrease her insulin levels and for some can help with the weight loss.

## 2019-07-27 ENCOUNTER — HOSPITAL ENCOUNTER (OUTPATIENT)
Dept: MAMMOGRAPHY | Age: 64
Discharge: HOME | End: 2019-07-27
Attending: FAMILY MEDICINE
Payer: COMMERCIAL

## 2019-07-27 DIAGNOSIS — Z12.31 SCREENING MAMMOGRAM, ENCOUNTER FOR: ICD-10-CM

## 2019-07-27 PROCEDURE — 77067 SCR MAMMO BI INCL CAD: CPT | Performed by: FAMILY MEDICINE

## 2019-07-27 PROCEDURE — 77063 BREAST TOMOSYNTHESIS BI: CPT | Performed by: FAMILY MEDICINE

## 2019-07-27 RX ORDER — METFORMIN HYDROCHLORIDE 500 MG/1
500 TABLET, EXTENDED RELEASE ORAL DAILY
Qty: 90 TABLET | Refills: 2 | Status: SHIPPED | OUTPATIENT
Start: 2019-07-27 | End: 2020-01-11

## 2019-07-27 NOTE — PROGRESS NOTES
Anjana Carter - I sent script for metformin to [facility]. I am not sure how long you will be on this medication.  If it is helping you, I will keep it there long term. - Dr. Sarah George

## 2019-08-27 ENCOUNTER — TELEPHONE (OUTPATIENT)
Dept: CASE MANAGEMENT | Age: 64
End: 2019-08-27

## 2019-08-27 NOTE — TELEPHONE ENCOUNTER
Patient returned call informed is due to complete FIT, states has kit at home and will complete and return to lab.

## 2019-08-27 NOTE — TELEPHONE ENCOUNTER
Patient is due to complete FIT ordered 7/15/19 or schedule GI consult for colonoscopy referral written 7/15/19. Left message to call back [phone].

## 2019-09-21 ENCOUNTER — APPOINTMENT (OUTPATIENT)
Dept: LAB | Age: 64
End: 2019-09-21
Attending: FAMILY MEDICINE
Payer: COMMERCIAL

## 2019-10-02 RX ORDER — CHLORTHALIDONE 25 MG/1
TABLET ORAL
Qty: 90 TABLET | Refills: 1 | Status: SHIPPED | OUTPATIENT
Start: 2019-10-02 | End: 2020-03-21

## 2019-10-02 RX ORDER — LOSARTAN POTASSIUM 50 MG/1
TABLET ORAL
Qty: 90 TABLET | Refills: 1 | Status: SHIPPED | OUTPATIENT
Start: 2019-10-02 | End: 2020-02-20

## 2019-10-03 NOTE — TELEPHONE ENCOUNTER
Refill passed per [facility], [facility] protocol.   Hypertensive Medications  Protocol Criteria:  · Appointment scheduled in the past 6 months or in the next 3 months  · BMP or CMP in the past 12 months  · Creatinine result < 2  Recent Outpatient Visits

## 2019-10-24 NOTE — TELEPHONE ENCOUNTER
Hospital Medicine History & Physical Note    Date of Service  10/23/2019    Primary Care Physician  Elian Crews M.D.    Consultants  Dr. Jacobo, GI    Code Status  Full    Chief Complaint  Chief Complaint   Patient presents with   • Abdominal Pain     described as stabbing xweeks, recently diagnosed w/ gallstones   • Nausea       History of Presenting Illness  31 y.o. female who presented on 10/23/2019 with abdominal pain and nausea.  This is a pleasant 31-year-old female with no reported medical problems who comes in with complaints of abdominal pain.  Is primarily located in the middle to the right upper quadrant of her abdomen, it has been waxing and waning for the past 3 weeks.  It was associated with nausea, no vomiting, and is aggravated when she eats.  She denies any alleviating factors.  The patient states that she was seen by her primary care provider and was recently diagnosed with gallstones on ultrasound.  She has not had any fevers, chills, diarrhea or dysuria.    Review of Systems  Review of Systems   Constitutional: Negative for chills and fever.   HENT: Negative for congestion and sore throat.    Eyes: Negative for photophobia.   Respiratory: Negative for cough, shortness of breath and wheezing.    Cardiovascular: Negative for chest pain and palpitations.   Gastrointestinal: Positive for abdominal pain and nausea. Negative for diarrhea and vomiting.   Genitourinary: Negative for dysuria.   Musculoskeletal: Negative for myalgias.   Skin: Negative.    Neurological: Negative for dizziness, tingling, focal weakness and headaches.   Psychiatric/Behavioral: Negative for depression and suicidal ideas.       Past Medical History  Past Medical History:   Diagnosis Date   • Pain 06/2019    shoulders/back   • Breastfeeding (infant) 06/2019   • Mastitis 05/2019   • Nausea/vomiting in pregnancy 10/5/2012   • Arthritis     toes   • Back pain        Surgical History  Past Surgical History:   Procedure Laterality  Pt should be seen earlier than April 9-will need f/u blood work. Date   • PB LAP,DIAGNOSTIC ABDOMEN  2019    Procedure: PELVISCOPY;  Surgeon: Pilar Vuong M.D.;  Location: SURGERY SAME DAY [facility];  Service: Gynecology   • SALPINGECTOMY Bilateral 2019    Procedure: SALPINGECTOMY;  Surgeon: Pilar Vuong M.D.;  Location: SURGERY SAME DAY [facility];  Service: Gynecology   • TUBAL COAGULATION LAPAROSCOPIC BILATERAL     • TUBAL LIGATION         Family History  Family History   Problem Relation Age of Onset   • Cancer Mother 45        breast cancer, lymph node,   • Heart Attack Father    • Gout Father    • Cancer Maternal Grandmother         breast cancer       Social History  Social History     Tobacco Use   • Smoking status: Former Smoker     Packs/day: 0.25     Years: 15.00     Pack years: 3.75     Types: Cigarettes     Last attempt to quit: 2019     Years since quittin.2   • Smokeless tobacco: Never Used   • Tobacco comment: Pt. states smoking 2-3 a day.    Substance Use Topics   • Alcohol use: No   • Drug use: No       Allergies  Allergies   Allergen Reactions   • Nkda [No Known Drug Allergy]        Medications  No current facility-administered medications on file prior to encounter.      Current Outpatient Medications on File Prior to Encounter   Medication Sig Dispense Refill   • tramadol (ULTRAM) 50 MG Tab Take 50 mg by mouth every 8 hours as needed (Pain).     • methocarbamol (ROBAXIN) 750 MG Tab Take 750 mg by mouth 4 times a day.     • medroxyPROGESTERone (DEPO-PROVERA) 150 MG/ML Suspension 150 mg by Intramuscular route every 90 days.         Physical Exam  Hemodynamics  Temp (24hrs), Av.7 °C (98.1 °F), Min:36.2 °C (97.2 °F), Max:37.2 °C (98.9 °F)   Temperature: 37.2 °C (98.9 °F)  Pulse  Av  Min: 43  Max: 78    Blood Pressure: 107/60      Respiratory      Respiration: 17, Pulse Oximetry: 95 %             Physical Exam   Constitutional: She is oriented to person, place, and time. No distress.   HENT:   Head: Normocephalic and  atraumatic.   Right Ear: External ear normal.   Left Ear: External ear normal.   Eyes: EOM are normal. Right eye exhibits no discharge. Left eye exhibits no discharge.   Neck: Neck supple. No JVD present.   Cardiovascular: Normal rate, regular rhythm and normal heart sounds.   Pulmonary/Chest: Effort normal and breath sounds normal. No respiratory distress. She exhibits no tenderness.   Abdominal: Soft. Bowel sounds are normal. She exhibits no distension. There is tenderness.   Musculoskeletal: Normal range of motion. She exhibits no edema.   Neurological: She is alert and oriented to person, place, and time. No cranial nerve deficit.   Skin: Skin is warm and dry. She is not diaphoretic. No erythema.   Psychiatric: She has a normal mood and affect. Her behavior is normal.   Nursing note and vitals reviewed.    Capillary refill less than 3 seconds, distal pulses intact    Laboratory:  Recent Labs     10/23/19  1534   WBC 13.7*   RBC 5.31   HEMOGLOBIN 16.0   HEMATOCRIT 47.7*   MCV 89.8   MCH 30.1   MCHC 33.5*   RDW 39.5   PLATELETCT 240   MPV 10.3     Recent Labs     10/23/19  1534   SODIUM 139   POTASSIUM 4.4   CHLORIDE 103   CO2 26   GLUCOSE 100*   BUN 12   CREATININE 0.80   CALCIUM 9.8     Recent Labs     10/23/19  1534   ALTSGPT 62*   ASTSGOT 83*   ALKPHOSPHAT 101*   TBILIRUBIN 1.2   LIPASE 22   GLUCOSE 100*                 No results found for: TROPONINI    Imaging  Us-abdomen Complete Survey    Result Date: 10/18/2019  10/18/2019 10:59 AM HISTORY/REASON FOR EXAM:  Chronic epigastric pain, nausea with eating TECHNIQUE/EXAM DESCRIPTION AND NUMBER OF VIEWS:  Complete abdomen survey. COMPARISON: Gallbladder ultrasound dated 6/22/2014 FINDINGS: The liver measures 16.04 cm. The liver is normal in contour. There is no evidence of solid mass lesion. The gallbladder is normal in size. Cholelithiasis and gallbladder sludge. The gallbladder wall thickness measures 0.33 cm. There is no pericholecystic fluid. The common duct  measures 0.62 cm. The visualized pancreas is unremarkable. The visualized aorta is normal in caliber. Intrahepatic IVC is patent. The portal vein is patent with hepatopetal flow. The MPV measures 0.75 cm. The right kidney measures 11.55 cm. The left kidney measures 9.97 cm. There is no hydronephrosis. The spleen measures 9.62 cm maximally. The bladder was empty at the time of scan. There is no ascites.     1.  Cholelithiasis and borderline thickened gallbladder wall. No pericholecystic fluid to suggest cholecystitis. 2.  Otherwise unremarkable abdominal ultrasound.     Us-ruq    Result Date: 10/23/2019  10/23/2019 6:04 PM HISTORY/REASON FOR EXAM:  hx of stones now with + reyes's and elevated LFTs RIGHT upper quadrant abdominal pain. TECHNIQUE/EXAM DESCRIPTION AND NUMBER OF VIEWS:  Real-time sonography of the liver and biliary tree. COMPARISON: Abdominal ultrasound 10/18/2019 FINDINGS: The liver is normal in contour. There is no evidence of solid mass lesion. The liver measures 14.4 cm. Gallbladder shows echogenic shadowing foci consistent with stones. The gallbladder wall thickness measures 3 mm. There is no pericholecystic fluid. The common duct measures 8 mm.  Echogenic material within the distal common bile duct consistent with stones and/or debris. The visualized pancreas is unremarkable. The visualized aorta is normal in caliber. Intrahepatic IVC is patent. The portal vein is patent with hepatopetal flow. The MPV measures 0.9 cm. The right kidney measures 12 cm. There is no ascites.     1.  Cholelithiasis 2.  Biliary dilation with distal common bile duct stone and/or debris. 3.  No gross evidence of acute cholecystitis.         Assessment/Plan:  Anticipate that patient will need less than 2 midnights for management of the discussed medical issues.    * Biliary obstruction  Assessment & Plan  Ultrasound of the abdomen shows dilated common bile duct to 8 mm with echogenic material within the distal common bile  duct consistent with a stone.  Dr. Jacobo of gastroenterology was consulted by the emergency room physician and I appreciate his recommendations.  In the meantime, the patient will be admitted to the hospital, kept n.p.o. for bowel rest awaiting ERCP in the morning, and receive symptomatic management for pain.  Following this, we will plan to consult general surgery to discuss laparoscopic cholecystectomy during this hospitalization for definitive management.    Leukocytosis  Assessment & Plan  Patient is not jaundiced, she is afebrile with stable vital signs.  Suspect reactive secondary to biliary obstruction.  Holding off on antibiotics for now but will monitor closely for any changes in her clinical status.    Transaminitis  Assessment & Plan  Treatment as noted above for biliary obstruction, expect improvement with ERCP.      Prophylaxis: Sequential compression devices for DVT prophylaxis, no PPI indicated, bowel protocol as needed

## 2019-12-04 RX ORDER — RANITIDINE 300 MG/1
300 TABLET ORAL NIGHTLY
Qty: 90 TABLET | Refills: 1 | Status: SHIPPED | OUTPATIENT
Start: 2019-12-04 | End: 2020-03-04

## 2019-12-04 NOTE — TELEPHONE ENCOUNTER
Refill passed per [facility], [facility] protocol. However pls review warning.     Requested Prescriptions   Pending Prescriptions Disp Refills   • RANITIDINE  MG Oral Tab [Pharmacy Med Name: Ranitidine Hcl 300 Mg Tab Amne] 90 tablet 0     Sig: Take 1 table

## 2019-12-15 RX ORDER — RANITIDINE 300 MG/1
300 TABLET ORAL NIGHTLY
Qty: 90 TABLET | Refills: 0 | OUTPATIENT
Start: 2019-12-15

## 2019-12-15 NOTE — TELEPHONE ENCOUNTER
Duplicate request, previously addressed.     Requested Prescriptions   Pending Prescriptions Disp Refills   • RANITIDINE  MG Oral Tab [Pharmacy Med Name: Ranitidine Hcl 300 Mg Tab Amne] 90 tablet 0     Sig: Take 1 tablet (300 mg total) by mouth night

## 2019-12-16 RX ORDER — RANITIDINE 300 MG/1
300 TABLET ORAL NIGHTLY
Qty: 90 TABLET | Refills: 0 | OUTPATIENT
Start: 2019-12-16

## 2020-01-11 RX ORDER — METFORMIN HYDROCHLORIDE 500 MG/1
500 TABLET, EXTENDED RELEASE ORAL DAILY
Qty: 90 TABLET | Refills: 0 | Status: SHIPPED | OUTPATIENT
Start: 2020-01-11 | End: 2020-02-20

## 2020-01-12 NOTE — TELEPHONE ENCOUNTER
only 3 month supply approved. Pt needs appt with PCP for further refills. please inform patient.     [employer] message sent to pt      Requested Prescriptions   Pending Prescriptions Disp Refills   • METFORMIN HCL  MG Oral Tablet 24 Hr [Pharmacy Med Nam

## 2020-01-23 NOTE — TELEPHONE ENCOUNTER
No upcoming appointments found, however pt read [facility] message    Daron Lynn, KIT   to Kirby Farzaneh            1/11/20 9:35 PM   Lei Bennett office has received a refill request for:metforming 500mg from your pharmacy.  This refill has

## 2020-02-20 ENCOUNTER — OFFICE VISIT (OUTPATIENT)
Dept: FAMILY MEDICINE CLINIC | Facility: CLINIC | Age: 65
End: 2020-02-20
Payer: COMMERCIAL

## 2020-02-20 VITALS
WEIGHT: 246 LBS | DIASTOLIC BLOOD PRESSURE: 74 MMHG | RESPIRATION RATE: 19 BRPM | BODY MASS INDEX: 38.61 KG/M2 | TEMPERATURE: 98 F | HEIGHT: 67 IN | SYSTOLIC BLOOD PRESSURE: 112 MMHG | HEART RATE: 87 BPM

## 2020-02-20 DIAGNOSIS — I10 ESSENTIAL HYPERTENSION: Primary | ICD-10-CM

## 2020-02-20 DIAGNOSIS — R73.03 PREDIABETES: ICD-10-CM

## 2020-02-20 PROCEDURE — 99213 OFFICE O/P EST LOW 20 MIN: CPT | Performed by: FAMILY MEDICINE

## 2020-02-20 RX ORDER — POTASSIUM CHLORIDE 750 MG/1
20 TABLET, FILM COATED, EXTENDED RELEASE ORAL DAILY
Qty: 180 TABLET | Refills: 3 | Status: SHIPPED | OUTPATIENT
Start: 2020-02-20 | End: 2021-07-23

## 2020-02-20 RX ORDER — LOSARTAN POTASSIUM 50 MG/1
50 TABLET ORAL
Qty: 90 TABLET | Refills: 1 | Status: SHIPPED | OUTPATIENT
Start: 2020-02-20 | End: 2020-09-13

## 2020-02-20 RX ORDER — METFORMIN HYDROCHLORIDE 500 MG/1
500 TABLET, EXTENDED RELEASE ORAL DAILY
Qty: 90 TABLET | Refills: 0 | Status: SHIPPED | OUTPATIENT
Start: 2020-02-20 | End: 2020-07-24

## 2020-02-20 NOTE — PROGRESS NOTES
Jimbo Munguia is a 62year old female. Patient presents with:  HTN: 6mos f/u  Diabetes: 6mos f/u  Potassium Problem: 6mos f/u    HPI:   Reports last week was having sharp pain in left lower back for 4 days. No radiation into her legs. Reports no pain now. supple,no adenopathy,no bruits  LUNGS: clear to auscultation  CARDIO: RRR without murmur  GI: good BS's,no masses, HSM or tenderness  EXTREMITIES: no cyanosis, clubbing or edema  Musculoskeletal: negative straight leg raise. ASSESSMENT AND PLAN:   1.  E

## 2020-02-22 ENCOUNTER — LAB ENCOUNTER (OUTPATIENT)
Dept: LAB | Age: 65
End: 2020-02-22
Attending: FAMILY MEDICINE
Payer: COMMERCIAL

## 2020-02-22 DIAGNOSIS — I10 ESSENTIAL HYPERTENSION: ICD-10-CM

## 2020-02-22 DIAGNOSIS — R73.03 PREDIABETES: ICD-10-CM

## 2020-02-22 LAB
ALBUMIN SERPL-MCNC: 3.4 G/DL (ref 3.4–5)
ALBUMIN/GLOB SERPL: 0.9 {RATIO} (ref 1–2)
ALP LIVER SERPL-CCNC: 81 U/L (ref 50–130)
ALT SERPL-CCNC: 22 U/L (ref 13–56)
ANION GAP SERPL CALC-SCNC: 5 MMOL/L (ref 0–18)
AST SERPL-CCNC: 19 U/L (ref 15–37)
BASOPHILS # BLD AUTO: 0.05 X10(3) UL (ref 0–0.2)
BASOPHILS NFR BLD AUTO: 0.6 %
BILIRUB SERPL-MCNC: 0.8 MG/DL (ref 0.1–2)
BUN BLD-MCNC: 30 MG/DL (ref 7–18)
BUN/CREAT SERPL: 33.3 (ref 10–20)
CALCIUM BLD-MCNC: 9.4 MG/DL (ref 8.5–10.1)
CHLORIDE SERPL-SCNC: 107 MMOL/L (ref 98–112)
CHOLEST SMN-MCNC: 203 MG/DL (ref ?–200)
CO2 SERPL-SCNC: 29 MMOL/L (ref 21–32)
CREAT BLD-MCNC: 0.9 MG/DL (ref 0.55–1.02)
CREAT UR-SCNC: 155 MG/DL
DEPRECATED RDW RBC AUTO: 45.1 FL (ref 35.1–46.3)
EOSINOPHIL # BLD AUTO: 0.23 X10(3) UL (ref 0–0.7)
EOSINOPHIL NFR BLD AUTO: 2.8 %
ERYTHROCYTE [DISTWIDTH] IN BLOOD BY AUTOMATED COUNT: 14.7 % (ref 11–15)
EST. AVERAGE GLUCOSE BLD GHB EST-MCNC: 131 MG/DL (ref 68–126)
GLOBULIN PLAS-MCNC: 3.7 G/DL (ref 2.8–4.4)
GLUCOSE BLD-MCNC: 94 MG/DL (ref 70–99)
HBA1C MFR BLD HPLC: 6.2 % (ref ?–5.7)
HCT VFR BLD AUTO: 41.1 % (ref 35–48)
HDLC SERPL-MCNC: 49 MG/DL (ref 40–59)
HGB BLD-MCNC: 13.1 G/DL (ref 12–16)
IMM GRANULOCYTES # BLD AUTO: 0.03 X10(3) UL (ref 0–1)
IMM GRANULOCYTES NFR BLD: 0.4 %
LDLC SERPL CALC-MCNC: 125 MG/DL (ref ?–100)
LYMPHOCYTES # BLD AUTO: 2.45 X10(3) UL (ref 1–4)
LYMPHOCYTES NFR BLD AUTO: 29.4 %
M PROTEIN MFR SERPL ELPH: 7.1 G/DL (ref 6.4–8.2)
MCH RBC QN AUTO: 27 PG (ref 26–34)
MCHC RBC AUTO-ENTMCNC: 31.9 G/DL (ref 31–37)
MCV RBC AUTO: 84.6 FL (ref 80–100)
MICROALBUMIN UR-MCNC: 3.42 MG/DL
MICROALBUMIN/CREAT 24H UR-RTO: 22.1 UG/MG (ref ?–30)
MONOCYTES # BLD AUTO: 0.67 X10(3) UL (ref 0.1–1)
MONOCYTES NFR BLD AUTO: 8.1 %
NEUTROPHILS # BLD AUTO: 4.89 X10 (3) UL (ref 1.5–7.7)
NEUTROPHILS # BLD AUTO: 4.89 X10(3) UL (ref 1.5–7.7)
NEUTROPHILS NFR BLD AUTO: 58.7 %
NONHDLC SERPL-MCNC: 154 MG/DL (ref ?–130)
OSMOLALITY SERPL CALC.SUM OF ELEC: 298 MOSM/KG (ref 275–295)
PATIENT FASTING Y/N/NP: YES
PATIENT FASTING Y/N/NP: YES
PLATELET # BLD AUTO: 276 10(3)UL (ref 150–450)
POTASSIUM SERPL-SCNC: 3.7 MMOL/L (ref 3.5–5.1)
RBC # BLD AUTO: 4.86 X10(6)UL (ref 3.8–5.3)
SODIUM SERPL-SCNC: 141 MMOL/L (ref 136–145)
TRIGL SERPL-MCNC: 143 MG/DL (ref 30–149)
TSI SER-ACNC: 1.29 MIU/ML (ref 0.36–3.74)
VLDLC SERPL CALC-MCNC: 29 MG/DL (ref 0–30)
WBC # BLD AUTO: 8.3 X10(3) UL (ref 4–11)

## 2020-02-22 PROCEDURE — 80061 LIPID PANEL: CPT

## 2020-02-22 PROCEDURE — 84443 ASSAY THYROID STIM HORMONE: CPT

## 2020-02-22 PROCEDURE — 80053 COMPREHEN METABOLIC PANEL: CPT

## 2020-02-22 PROCEDURE — 82043 UR ALBUMIN QUANTITATIVE: CPT

## 2020-02-22 PROCEDURE — 36415 COLL VENOUS BLD VENIPUNCTURE: CPT

## 2020-02-22 PROCEDURE — 85025 COMPLETE CBC W/AUTO DIFF WBC: CPT

## 2020-02-22 PROCEDURE — 83036 HEMOGLOBIN GLYCOSYLATED A1C: CPT

## 2020-02-22 PROCEDURE — 82570 ASSAY OF URINE CREATININE: CPT

## 2020-03-01 NOTE — PROGRESS NOTES
Jaquan Form - Your labs are stable. You are still in the prediabetes stage.  Please continue current medications the same. - Dr. Judah Cid

## 2020-03-03 ENCOUNTER — NURSE TRIAGE (OUTPATIENT)
Dept: FAMILY MEDICINE CLINIC | Facility: CLINIC | Age: 65
End: 2020-03-03

## 2020-03-03 NOTE — TELEPHONE ENCOUNTER
Action Requested: Summary for Provider     []  Critical Lab, Recommendations Needed  [] Need Additional Advice  []   FYI    []   Need Orders  [] Need Medications Sent to Pharmacy  []  Other     SUMMARY: Patient c/o sore throat 7/10 with productive cough of
 used

## 2020-03-04 ENCOUNTER — OFFICE VISIT (OUTPATIENT)
Dept: FAMILY MEDICINE CLINIC | Facility: CLINIC | Age: 65
End: 2020-03-04
Payer: COMMERCIAL

## 2020-03-04 VITALS
TEMPERATURE: 98 F | HEART RATE: 103 BPM | HEIGHT: 67 IN | BODY MASS INDEX: 39 KG/M2 | SYSTOLIC BLOOD PRESSURE: 122 MMHG | DIASTOLIC BLOOD PRESSURE: 83 MMHG

## 2020-03-04 DIAGNOSIS — J06.9 VIRAL UPPER RESPIRATORY TRACT INFECTION: Primary | ICD-10-CM

## 2020-03-04 PROCEDURE — 99213 OFFICE O/P EST LOW 20 MIN: CPT | Performed by: NURSE PRACTITIONER

## 2020-03-04 RX ORDER — GUAIFENESIN AND CODEINE PHOSPHATE 100; 10 MG/5ML; MG/5ML
SOLUTION ORAL
Qty: 118 ML | Refills: 0 | Status: SHIPPED | OUTPATIENT
Start: 2020-03-04 | End: 2020-11-12 | Stop reason: ALTCHOICE

## 2020-03-04 NOTE — ASSESSMENT & PLAN NOTE
Supportive care discussed to help alleviate symptoms  Please call if symptoms worsen or are not resolving.     Most likely influenza but s/s started 4 days ago-discussed with pt that we are unable to prescribe tamiflu

## 2020-03-04 NOTE — PROGRESS NOTES
HPI  Pt here for cough, sore throat, headache, body aches for the past 4 days. Has a hoarse voice and left ear pain. No known fever but woke up drenched in sweat. Cough is now productive. Is very  fatigued    Did have a flu shot this season.    Review of Medical: Not on file        Non-medical: Not on file    Tobacco Use      Smoking status: Former Smoker        Quit date: 2012        Years since quittin.1      Smokeless tobacco: Former User    Substance and Sexual Activity      Alcohol use:  Yes mouth daily. 90 tablet 0   • Potassium Chloride ER 10 MEQ Oral Tab CR Take 2 tablets (20 mEq total) by mouth daily.  180 tablet 3   • CHLORTHALIDONE 25 MG Oral Tab TAKE ONE TABLET BY MOUTH ONE TIME DAILY  90 tablet 1       Allergies:  No Known Allergies resolving. Most likely influenza but s/s started 4 days ago-discussed with pt that we are unable to prescribe tamiflu                           Discussed plan of care with pt and pt is in agreement. All questions answered.  Pt to call with questions or co

## 2020-03-21 RX ORDER — CHLORTHALIDONE 25 MG/1
TABLET ORAL
Qty: 90 TABLET | Refills: 0 | Status: SHIPPED | OUTPATIENT
Start: 2020-03-21 | End: 2020-06-15

## 2020-06-15 RX ORDER — CHLORTHALIDONE 25 MG/1
TABLET ORAL
Qty: 90 TABLET | Refills: 1 | Status: SHIPPED | OUTPATIENT
Start: 2020-06-15 | End: 2022-04-28

## 2020-07-24 RX ORDER — METFORMIN HYDROCHLORIDE 500 MG/1
TABLET, EXTENDED RELEASE ORAL
Qty: 90 TABLET | Refills: 0 | Status: SHIPPED | OUTPATIENT
Start: 2020-07-24 | End: 2020-10-19

## 2020-09-13 RX ORDER — LOSARTAN POTASSIUM 50 MG/1
TABLET ORAL
Qty: 90 TABLET | Refills: 0 | Status: SHIPPED | OUTPATIENT
Start: 2020-09-13 | End: 2021-04-17

## 2020-10-18 ENCOUNTER — TELEPHONE (OUTPATIENT)
Dept: FAMILY MEDICINE CLINIC | Facility: CLINIC | Age: 65
End: 2020-10-18

## 2020-10-18 DIAGNOSIS — R73.03 PREDIABETES: Primary | ICD-10-CM

## 2020-10-19 RX ORDER — METFORMIN HYDROCHLORIDE 500 MG/1
TABLET, EXTENDED RELEASE ORAL
Qty: 90 TABLET | Refills: 0 | Status: SHIPPED | OUTPATIENT
Start: 2020-10-19 | End: 2021-01-20

## 2020-10-20 NOTE — TELEPHONE ENCOUNTER
[employer] message sent to patient, please verify it is viewed. If not, please call patient with the update.

## 2020-10-21 NOTE — TELEPHONE ENCOUNTER
Pt calling back and scheduled OV for 11/12/20 with Dr Haven Puckett. Pt asking if she can also have Pap on that day. Please advise if pt would need longer block of time for pap.       Pt was given central scheduling number to schedule labs

## 2020-10-24 ENCOUNTER — LAB ENCOUNTER (OUTPATIENT)
Dept: LAB | Age: 65
End: 2020-10-24
Attending: FAMILY MEDICINE
Payer: COMMERCIAL

## 2020-10-24 DIAGNOSIS — R73.03 PREDIABETES: ICD-10-CM

## 2020-10-24 PROCEDURE — 82043 UR ALBUMIN QUANTITATIVE: CPT

## 2020-10-24 PROCEDURE — 82570 ASSAY OF URINE CREATININE: CPT

## 2020-11-12 ENCOUNTER — OFFICE VISIT (OUTPATIENT)
Dept: FAMILY MEDICINE CLINIC | Facility: CLINIC | Age: 65
End: 2020-11-12
Payer: COMMERCIAL

## 2020-11-12 ENCOUNTER — LAB ENCOUNTER (OUTPATIENT)
Dept: LAB | Age: 65
End: 2020-11-12
Attending: FAMILY MEDICINE
Payer: COMMERCIAL

## 2020-11-12 VITALS
TEMPERATURE: 98 F | WEIGHT: 250 LBS | BODY MASS INDEX: 39.24 KG/M2 | SYSTOLIC BLOOD PRESSURE: 122 MMHG | HEIGHT: 67 IN | DIASTOLIC BLOOD PRESSURE: 79 MMHG | HEART RATE: 80 BPM

## 2020-11-12 DIAGNOSIS — Z12.31 VISIT FOR SCREENING MAMMOGRAM: ICD-10-CM

## 2020-11-12 DIAGNOSIS — Z01.419 ENCOUNTER FOR WELL WOMAN EXAM WITH ROUTINE GYNECOLOGICAL EXAM: ICD-10-CM

## 2020-11-12 DIAGNOSIS — R73.03 PREDIABETES: ICD-10-CM

## 2020-11-12 DIAGNOSIS — I10 ESSENTIAL HYPERTENSION: Primary | ICD-10-CM

## 2020-11-12 DIAGNOSIS — Z12.11 SCREEN FOR COLON CANCER: ICD-10-CM

## 2020-11-12 DIAGNOSIS — E55.9 VITAMIN D DEFICIENCY: ICD-10-CM

## 2020-11-12 PROCEDURE — 36415 COLL VENOUS BLD VENIPUNCTURE: CPT

## 2020-11-12 PROCEDURE — 84443 ASSAY THYROID STIM HORMONE: CPT

## 2020-11-12 PROCEDURE — 3008F BODY MASS INDEX DOCD: CPT | Performed by: FAMILY MEDICINE

## 2020-11-12 PROCEDURE — 80061 LIPID PANEL: CPT

## 2020-11-12 PROCEDURE — 82306 VITAMIN D 25 HYDROXY: CPT

## 2020-11-12 PROCEDURE — 83036 HEMOGLOBIN GLYCOSYLATED A1C: CPT

## 2020-11-12 PROCEDURE — 85025 COMPLETE CBC W/AUTO DIFF WBC: CPT

## 2020-11-12 PROCEDURE — 3078F DIAST BP <80 MM HG: CPT | Performed by: FAMILY MEDICINE

## 2020-11-12 PROCEDURE — 3074F SYST BP LT 130 MM HG: CPT | Performed by: FAMILY MEDICINE

## 2020-11-12 PROCEDURE — 99396 PREV VISIT EST AGE 40-64: CPT | Performed by: FAMILY MEDICINE

## 2020-11-12 PROCEDURE — 80053 COMPREHEN METABOLIC PANEL: CPT

## 2020-11-12 NOTE — PROGRESS NOTES
HPI:   Fran Ortega is a 62year old female who presents for a complete physical exam.   No LMP recorded. Patient is postmenopausal.  STill working her full time job and 2nd part time but stopped 3rd job. Feeling good otherwise. Taking her medications.  Fin Disease Mother    • Stroke Mother         CVA (Stroke)      Social History:   Social History    Tobacco Use      Smoking status: Former Smoker        Quit date: 2012        Years since quittin.8      Smokeless tobacco: Former User    Alcohol use: METABOLIC PANEL (14); Future  - CBC WITH DIFFERENTIAL WITH PLATELET; Future  - HEMOGLOBIN A1C; Future  - LIPID PANEL; Future  - MICROALB/CREAT RATIO, RANDOM URINE; Future  - TSH W REFLEX TO FREE T4; Future  - VITAMIN D, 25-HYDROXY; Future    3.  Visit for s

## 2020-11-21 ENCOUNTER — LAB ENCOUNTER (OUTPATIENT)
Dept: LAB | Age: 65
End: 2020-11-21
Attending: FAMILY MEDICINE
Payer: COMMERCIAL

## 2020-11-21 DIAGNOSIS — Z12.11 SCREEN FOR COLON CANCER: ICD-10-CM

## 2020-11-21 PROCEDURE — 82274 ASSAY TEST FOR BLOOD FECAL: CPT

## 2020-11-23 ENCOUNTER — PATIENT MESSAGE (OUTPATIENT)
Dept: FAMILY MEDICINE CLINIC | Facility: CLINIC | Age: 65
End: 2020-11-23

## 2020-11-23 DIAGNOSIS — E78.5 HYPERLIPIDEMIA, UNSPECIFIED HYPERLIPIDEMIA TYPE: Primary | ICD-10-CM

## 2020-11-23 NOTE — PROGRESS NOTES
Dahiana West are now in pre-diabetes stage which is great - improving glucose. But cholesterol is high and I would like to start her on crestor 5 mg in the evenings if she is ok with that I will send it and repeat labs in 3 months for cholesterol.

## 2020-11-24 RX ORDER — ROSUVASTATIN CALCIUM 5 MG/1
5 TABLET, COATED ORAL NIGHTLY
Qty: 90 TABLET | Refills: 4 | Status: SHIPPED | OUTPATIENT
Start: 2020-11-24 | End: 2022-02-05

## 2020-11-24 NOTE — TELEPHONE ENCOUNTER
From: Shaq Pina MD  To: Janay Rawls  Sent: 11/23/2020 2:10 PM CST  Subject: Test results    [address] are now in pre-diabetes stage which is great - improving glucose.  But cholesterol is high and I would like to start you on crestor 5 mg in the

## 2020-11-25 NOTE — TELEPHONE ENCOUNTER
Spoke with pt,  verified  Pt informed med for crestor 5 mg was sent to [address] [place] [address] by Dr Amor Barbosa, pt stated understanding. Per Dr Amor Barbosa:   Blanton Kmcaroline - I sent in the crestor. Take it in the evenings and repeat labs in 3 months.  Happy Thanksgiving! -

## 2020-12-12 ENCOUNTER — HOSPITAL ENCOUNTER (OUTPATIENT)
Dept: MAMMOGRAPHY | Age: 65
Discharge: HOME | End: 2020-12-12
Attending: FAMILY MEDICINE
Payer: COMMERCIAL

## 2020-12-12 DIAGNOSIS — Z12.31 VISIT FOR SCREENING MAMMOGRAM: ICD-10-CM

## 2020-12-12 PROCEDURE — 77067 SCR MAMMO BI INCL CAD: CPT | Performed by: FAMILY MEDICINE

## 2020-12-12 PROCEDURE — 77063 BREAST TOMOSYNTHESIS BI: CPT | Performed by: FAMILY MEDICINE

## 2021-01-20 RX ORDER — METFORMIN HYDROCHLORIDE 500 MG/1
TABLET, EXTENDED RELEASE ORAL
Qty: 90 TABLET | Refills: 0 | Status: SHIPPED | OUTPATIENT
Start: 2021-01-20 | End: 2021-04-17

## 2021-02-27 ENCOUNTER — LAB ENCOUNTER (OUTPATIENT)
Dept: LAB | Age: 66
End: 2021-02-27
Attending: FAMILY MEDICINE
Payer: COMMERCIAL

## 2021-02-27 DIAGNOSIS — Z01.419 ENCOUNTER FOR WELL WOMAN EXAM WITH ROUTINE GYNECOLOGICAL EXAM: ICD-10-CM

## 2021-02-27 DIAGNOSIS — E78.5 HYPERLIPIDEMIA, UNSPECIFIED HYPERLIPIDEMIA TYPE: ICD-10-CM

## 2021-02-27 DIAGNOSIS — R73.03 PREDIABETES: ICD-10-CM

## 2021-02-27 LAB
ALBUMIN SERPL-MCNC: 3.3 G/DL (ref 3.4–5)
ALBUMIN/GLOB SERPL: 0.9 {RATIO} (ref 1–2)
ALP LIVER SERPL-CCNC: 93 U/L
ALT SERPL-CCNC: 21 U/L
ANION GAP SERPL CALC-SCNC: 3 MMOL/L (ref 0–18)
AST SERPL-CCNC: 16 U/L (ref 15–37)
BILIRUB SERPL-MCNC: 0.6 MG/DL (ref 0.1–2)
BUN BLD-MCNC: 15 MG/DL (ref 7–18)
BUN/CREAT SERPL: 18.3 (ref 10–20)
CALCIUM BLD-MCNC: 9.2 MG/DL (ref 8.5–10.1)
CHLORIDE SERPL-SCNC: 110 MMOL/L (ref 98–112)
CHOLEST SMN-MCNC: 138 MG/DL (ref ?–200)
CO2 SERPL-SCNC: 29 MMOL/L (ref 21–32)
CREAT BLD-MCNC: 0.82 MG/DL
CREAT UR-SCNC: 83.3 MG/DL
GLOBULIN PLAS-MCNC: 3.5 G/DL (ref 2.8–4.4)
GLUCOSE BLD-MCNC: 100 MG/DL (ref 70–99)
HDLC SERPL-MCNC: 51 MG/DL (ref 40–59)
LDLC SERPL CALC-MCNC: 69 MG/DL (ref ?–100)
M PROTEIN MFR SERPL ELPH: 6.8 G/DL (ref 6.4–8.2)
MICROALBUMIN UR-MCNC: 0.74 MG/DL
MICROALBUMIN/CREAT 24H UR-RTO: 8.9 UG/MG (ref ?–30)
NONHDLC SERPL-MCNC: 87 MG/DL (ref ?–130)
OSMOLALITY SERPL CALC.SUM OF ELEC: 295 MOSM/KG (ref 275–295)
PATIENT FASTING Y/N/NP: YES
PATIENT FASTING Y/N/NP: YES
POTASSIUM SERPL-SCNC: 4.3 MMOL/L (ref 3.5–5.1)
SODIUM SERPL-SCNC: 142 MMOL/L (ref 136–145)
TRIGL SERPL-MCNC: 92 MG/DL (ref 30–149)
VLDLC SERPL CALC-MCNC: 18 MG/DL (ref 0–30)

## 2021-02-27 PROCEDURE — 82043 UR ALBUMIN QUANTITATIVE: CPT

## 2021-02-27 PROCEDURE — 80061 LIPID PANEL: CPT

## 2021-02-27 PROCEDURE — 80053 COMPREHEN METABOLIC PANEL: CPT

## 2021-02-27 PROCEDURE — 82570 ASSAY OF URINE CREATININE: CPT

## 2021-02-27 PROCEDURE — 36415 COLL VENOUS BLD VENIPUNCTURE: CPT

## 2021-03-03 NOTE — PROGRESS NOTES
Lei Quintana - Your labs look really good.  Continue current medications the same. - Dr. Seven Roes Pt states since last week she had "burning" pain to left upper back and left arm. Yesterday pt noticed red dots to left arm. Co pain to rash area.

## 2021-03-12 DIAGNOSIS — Z23 NEED FOR VACCINATION: ICD-10-CM

## 2021-04-17 RX ORDER — LOSARTAN POTASSIUM 50 MG/1
50 TABLET ORAL DAILY
Qty: 90 TABLET | Refills: 0 | Status: SHIPPED | OUTPATIENT
Start: 2021-04-17 | End: 2021-07-12

## 2021-04-17 RX ORDER — LOSARTAN POTASSIUM 50 MG/1
TABLET ORAL
Qty: 90 TABLET | Refills: 0 | OUTPATIENT
Start: 2021-04-17

## 2021-04-17 RX ORDER — METFORMIN HYDROCHLORIDE 500 MG/1
500 TABLET, EXTENDED RELEASE ORAL DAILY
Qty: 90 TABLET | Refills: 0 | Status: SHIPPED | OUTPATIENT
Start: 2021-04-17 | End: 2021-05-05

## 2021-04-17 RX ORDER — METFORMIN HYDROCHLORIDE 500 MG/1
TABLET, EXTENDED RELEASE ORAL
Qty: 90 TABLET | Refills: 0 | OUTPATIENT
Start: 2021-04-17

## 2021-05-05 ENCOUNTER — TELEPHONE (OUTPATIENT)
Dept: FAMILY MEDICINE CLINIC | Facility: CLINIC | Age: 66
End: 2021-05-05

## 2021-05-05 NOTE — TELEPHONE ENCOUNTER
Pt requesting call back from  or nurse regarding metFORMIN HCl  MG Oral Tablet 24 Hr.    Says her new insurance [facility] will not cover that medication and was trying to see if there was an alternative medication  could prescribe for her

## 2021-06-02 ENCOUNTER — NURSE TRIAGE (OUTPATIENT)
Dept: FAMILY MEDICINE CLINIC | Facility: CLINIC | Age: 66
End: 2021-06-02

## 2021-06-02 NOTE — TELEPHONE ENCOUNTER
Action Requested: Summary for Provider     []  Critical Lab, Recommendations Needed  [] Need Additional Advice  []   FYI    []   Need Orders  [] Need Medications Sent to Pharmacy  []  Other     SUMMARY: Per protocol advised : OV   Future Appointments   Adebayo

## 2021-06-03 ENCOUNTER — HOSPITAL ENCOUNTER (OUTPATIENT)
Dept: GENERAL RADIOLOGY | Age: 66
Discharge: HOME | End: 2021-06-03
Attending: FAMILY MEDICINE
Payer: COMMERCIAL

## 2021-06-03 ENCOUNTER — OFFICE VISIT (OUTPATIENT)
Dept: FAMILY MEDICINE CLINIC | Facility: CLINIC | Age: 66
End: 2021-06-03
Payer: COMMERCIAL

## 2021-06-03 VITALS
HEART RATE: 86 BPM | WEIGHT: 259 LBS | HEIGHT: 67 IN | BODY MASS INDEX: 40.65 KG/M2 | DIASTOLIC BLOOD PRESSURE: 80 MMHG | SYSTOLIC BLOOD PRESSURE: 133 MMHG | TEMPERATURE: 97 F

## 2021-06-03 DIAGNOSIS — G89.29 CHRONIC PAIN OF LEFT ANKLE: Primary | ICD-10-CM

## 2021-06-03 DIAGNOSIS — G89.29 CHRONIC PAIN OF LEFT ANKLE: ICD-10-CM

## 2021-06-03 DIAGNOSIS — M25.572 CHRONIC PAIN OF LEFT ANKLE: Primary | ICD-10-CM

## 2021-06-03 DIAGNOSIS — M25.572 CHRONIC PAIN OF LEFT ANKLE: ICD-10-CM

## 2021-06-03 PROCEDURE — 3008F BODY MASS INDEX DOCD: CPT | Performed by: FAMILY MEDICINE

## 2021-06-03 PROCEDURE — 3075F SYST BP GE 130 - 139MM HG: CPT | Performed by: FAMILY MEDICINE

## 2021-06-03 PROCEDURE — 99213 OFFICE O/P EST LOW 20 MIN: CPT | Performed by: FAMILY MEDICINE

## 2021-06-03 PROCEDURE — 73610 X-RAY EXAM OF ANKLE: CPT | Performed by: FAMILY MEDICINE

## 2021-06-03 PROCEDURE — 3079F DIAST BP 80-89 MM HG: CPT | Performed by: FAMILY MEDICINE

## 2021-06-03 RX ORDER — IBUPROFEN 200 MG
400 TABLET ORAL EVERY 6 HOURS PRN
COMMUNITY

## 2021-06-03 RX ORDER — CELECOXIB 200 MG/1
200 CAPSULE ORAL DAILY
Qty: 90 CAPSULE | Refills: 1 | Status: SHIPPED | OUTPATIENT
Start: 2021-06-03 | End: 2021-11-23

## 2021-06-03 NOTE — PROGRESS NOTES
Lei Ballard - X-ray looks normal except the swelling.  There is small heel spur but otherwise normal. If pain is not improving, I will send you to see ortho. - Dr. Marci Singh

## 2021-06-03 NOTE — PROGRESS NOTES
Niki Frost is a 60year old female. Patient presents with:  Swelling: left ankle and foot x 2-3 months    HPI:   Reports no specific injury. Reports issues mostly while working.  Standing and sitting - not in one position during day but then works in even pain  GI: denies abdominal pain and denies heartburn  NEURO: denies headaches  Musculoskeletal: pos joint pain, back pain    EXAM:   /80   Pulse 86   Temp 97 °F (36.1 °C) (Temporal)   Ht 5' 7\" (1.702 m)   Wt 259 lb (117.5 kg)   BMI 40.57 kg/m²   GEN

## 2021-07-12 RX ORDER — LOSARTAN POTASSIUM 50 MG/1
50 TABLET ORAL DAILY
Qty: 90 TABLET | Refills: 0 | Status: SHIPPED | OUTPATIENT
Start: 2021-07-12 | End: 2021-10-07

## 2021-07-12 RX ORDER — METFORMIN HYDROCHLORIDE 500 MG/1
TABLET, EXTENDED RELEASE ORAL
Qty: 90 TABLET | Refills: 0 | OUTPATIENT
Start: 2021-07-12

## 2021-07-23 RX ORDER — POTASSIUM CHLORIDE 750 MG/1
20 TABLET, FILM COATED, EXTENDED RELEASE ORAL DAILY
Qty: 180 TABLET | Refills: 2 | Status: SHIPPED | OUTPATIENT
Start: 2021-07-23 | End: 2022-04-16

## 2021-10-07 RX ORDER — LOSARTAN POTASSIUM 50 MG/1
50 TABLET ORAL DAILY
Qty: 90 TABLET | Refills: 0 | OUTPATIENT
Start: 2021-10-07

## 2021-10-07 RX ORDER — LOSARTAN POTASSIUM 50 MG/1
50 TABLET ORAL DAILY
Qty: 90 TABLET | Refills: 1 | OUTPATIENT
Start: 2021-10-07

## 2021-10-07 RX ORDER — LOSARTAN POTASSIUM 50 MG/1
50 TABLET ORAL DAILY
Qty: 90 TABLET | Refills: 1 | Status: SHIPPED | OUTPATIENT
Start: 2021-10-07 | End: 2022-03-28

## 2021-10-07 NOTE — TELEPHONE ENCOUNTER
Refill passed per [address] protocol. Requested Prescriptions   Pending Prescriptions Disp Refills    LOSARTAN 50 MG Oral Tab [Pharmacy Med Name: Losartan Potassium 50 Mg Tab [facility]] 90 tablet 0     Sig: Take 1 tablet (50 mg total) by mouth daily.         Hypertensive Medications Protocol Passed - 10/7/2021  8:59 AM        Passed - CMP or BMP in past 12 months        Passed - Appointment in past 6 or next 3 months        Passed - GFR Non- > 50     Lab Results   Component Value Date    GFRNAA 76 02/27/2021                        Recent Outpatient Visits              4 months ago Chronic pain of left ankle    [address]    Office Visit    10 months ago Essential hypertension    [address], Mk Bolanos MD    Office Visit    1 year ago Viral upper respiratory tract infection    [address], Jignesh Mauro, APRCORBY    Office Visit    1 year ago Essential hypertension    [address]    Office Visit    2 years ago Essential hypertension    [address], Venessa Rodriguez MD    Office Visit

## 2021-11-23 RX ORDER — CELECOXIB 200 MG/1
200 CAPSULE ORAL DAILY
Qty: 90 CAPSULE | Refills: 1 | Status: SHIPPED | OUTPATIENT
Start: 2021-11-23

## 2021-11-23 NOTE — TELEPHONE ENCOUNTER
Refill passed per [facility] protocol, but ordered for daily trial of Celebrex at 6/03/2021 office visit    Please send, if appropriate      Requested Prescriptions   Pending Prescriptions Disp Refills    CELECOXIB 200 MG Oral Cap [Pharmacy Med Name:

## 2022-02-05 RX ORDER — ROSUVASTATIN CALCIUM 5 MG/1
5 TABLET, COATED ORAL NIGHTLY
Qty: 90 TABLET | Refills: 1 | Status: SHIPPED | OUTPATIENT
Start: 2022-02-05 | End: 2022-08-09

## 2022-02-05 NOTE — TELEPHONE ENCOUNTER
Refill passed per [address] protocol.   Requested Prescriptions   Pending Prescriptions Disp Refills    ROSUVASTATIN 5 MG Oral Tab [Pharmacy Med Name: Rosuvastatin Calcium 5 Mg Tab [place]] 90 tablet 0     Sig: TAKE ONE TABLET BY MOUTH AT BEDTIME        Cholesterol Medication Protocol Passed - 2/4/2022 10:45 PM        Passed - ALT in past 12 months        Passed - LDL in past 12 months        Passed - Last ALT < 80       Lab Results   Component Value Date    ALT 21 02/27/2021             Passed - Last LDL < 130     Lab Results   Component Value Date    LDL 69 02/27/2021               Passed - Appointment in past 12 or next 3 months               Recent Outpatient Visits              8 months ago Chronic pain of left ankle    [address]    Office Visit    1 year ago Essential hypertension    [address] Lanae Roof, MD    Office Visit    1 year ago Viral upper respiratory tract infection    [address] [place] Shelia Spalding, APRN    Office Visit    1 year ago Essential hypertension    [address]    Office Visit    2 years ago Essential hypertension    [address], Ladan Clark MD    Office Visit

## 2022-02-25 NOTE — TELEPHONE ENCOUNTER
No call back from pt, again no Answer @ home tel#[phone], LMTCB ext [phone]. Rhomboid Transposition Flap Text: The defect edges were debeveled with a #15 scalpel blade.  Given the location of the defect and the proximity to free margins a rhomboid transposition flap was deemed most appropriate.  Using a sterile surgical marker, an appropriate rhomboid flap was drawn incorporating the defect.    The area thus outlined was incised deep to adipose tissue with a #15 scalpel blade.  The skin margins were undermined to an appropriate distance in all directions utilizing iris scissors.

## 2022-03-15 ENCOUNTER — TELEPHONE (OUTPATIENT)
Dept: FAMILY MEDICINE CLINIC | Facility: CLINIC | Age: 67
End: 2022-03-15

## 2022-03-26 NOTE — TELEPHONE ENCOUNTER
Protocol failed or has No Protocol, please review  Requested Prescriptions   Pending Prescriptions Disp Refills    LOSARTAN 50 MG Oral Tab [Pharmacy Med Name: Losartan Potassium 50 Mg Tab [facility]] 90 tablet 0     Sig: TAKE ONE TABLET BY MOUTH ONE TIME DAILY        Hypertensive Medications Protocol Failed - 3/26/2022  1:33 AM        Failed - CMP or BMP in past 12 months        Failed - Appointment in past 6 or next 3 months        Passed - GFR Non- > 50     Lab Results   Component Value Date    GFRNAA 76 02/27/2021                       Recent Outpatient Visits              9 months ago Chronic pain of left ankle    Matheus Wayne MD    Office Visit    1 year ago Essential hypertension    Matheus Wayne MD    Office Visit    2 years ago Viral upper respiratory tract infection    Amanda Garcia, [address], Jignesh Heredia, [place]    Office Visit    2 years ago Essential hypertension    Matheus Wayne MD    Office Visit    2 years ago Essential hypertension    Arian Wayne, Elana Bravo MD    Office Visit

## 2022-03-28 RX ORDER — LOSARTAN POTASSIUM 50 MG/1
50 TABLET ORAL DAILY
Qty: 90 TABLET | Refills: 0 | Status: SHIPPED | OUTPATIENT
Start: 2022-03-28 | End: 2022-04-28

## 2022-04-16 RX ORDER — POTASSIUM CHLORIDE 750 MG/1
20 TABLET, FILM COATED, EXTENDED RELEASE ORAL DAILY
Qty: 180 TABLET | Refills: 1 | Status: SHIPPED | OUTPATIENT
Start: 2022-04-16 | End: 2022-11-05

## 2022-04-16 NOTE — TELEPHONE ENCOUNTER
Please review refill failed/no protocol - last K level 2/27/21: 4.3    Requested Prescriptions     Pending Prescriptions Disp Refills    POTASSIUM CHLORIDE ER 10 MEQ Oral Tab CR [Pharmacy Med Name: Potassium Chloride Er 10 Meq Tab Adina] 180 tablet 0     Sig: TAKE TWO TABLETS BY MOUTH DAILY         Recent Visits  Date Type Provider Dept   06/03/21 Office Visit Mike Yanes MD [facility]   11/12/20 Office Visit Mike Yanes MD [facility]   Showing recent visits within past 540 days with a meds authorizing provider and meeting all other requirements  Future Appointments  No visits were found meeting these conditions.   Showing future appointments within next 150 days with a meds authorizing provider and meeting all other requirements    Requested Prescriptions   Pending Prescriptions Disp Refills    POTASSIUM CHLORIDE ER 10 MEQ Oral Tab CR [Pharmacy Med Name: Potassium Chloride Er 10 Meq Tab Adina] 180 tablet 0     Sig: TAKE TWO TABLETS BY MOUTH DAILY        There is no refill protocol information for this order           Recent Outpatient Visits              10 months ago Chronic pain of left ankle    [address] Rivera MD    Office Visit    1 year ago Essential hypertension    [place] [employer], [facility], [address], Amparo Beckham MD    Office Visit    2 years ago Viral upper respiratory tract infection    [facility], [facility], [address], [place] Rosales    Office Visit    2 years ago Essential hypertension    [address] MD    Office Visit    2 years ago Essential hypertension    [address], Tico Mcgowan MD    Office Visit

## 2022-04-23 ENCOUNTER — LAB ENCOUNTER (OUTPATIENT)
Dept: LAB | Age: 67
End: 2022-04-23
Attending: FAMILY MEDICINE
Payer: MEDICARE

## 2022-04-23 DIAGNOSIS — Z12.11 COLON CANCER SCREENING: ICD-10-CM

## 2022-04-23 PROCEDURE — 82274 ASSAY TEST FOR BLOOD FECAL: CPT

## 2022-04-25 LAB — HEMOCCULT STL QL: NEGATIVE

## 2022-04-28 ENCOUNTER — OFFICE VISIT (OUTPATIENT)
Dept: FAMILY MEDICINE CLINIC | Facility: CLINIC | Age: 67
End: 2022-04-28
Payer: MEDICARE

## 2022-04-28 ENCOUNTER — LAB ENCOUNTER (OUTPATIENT)
Dept: LAB | Age: 67
End: 2022-04-28
Attending: FAMILY MEDICINE
Payer: MEDICARE

## 2022-04-28 VITALS
HEART RATE: 86 BPM | TEMPERATURE: 98 F | SYSTOLIC BLOOD PRESSURE: 146 MMHG | DIASTOLIC BLOOD PRESSURE: 88 MMHG | BODY MASS INDEX: 41.65 KG/M2 | HEIGHT: 67 IN | WEIGHT: 265.38 LBS

## 2022-04-28 DIAGNOSIS — E55.9 VITAMIN D DEFICIENCY: ICD-10-CM

## 2022-04-28 DIAGNOSIS — Z00.00 ENCOUNTER FOR ANNUAL HEALTH EXAMINATION: ICD-10-CM

## 2022-04-28 DIAGNOSIS — I10 ESSENTIAL HYPERTENSION: Primary | ICD-10-CM

## 2022-04-28 DIAGNOSIS — E66.01 OBESITY, MORBID (HCC): ICD-10-CM

## 2022-04-28 DIAGNOSIS — Z12.31 VISIT FOR SCREENING MAMMOGRAM: ICD-10-CM

## 2022-04-28 DIAGNOSIS — R73.03 PREDIABETES: ICD-10-CM

## 2022-04-28 DIAGNOSIS — E78.5 HYPERLIPIDEMIA, UNSPECIFIED HYPERLIPIDEMIA TYPE: ICD-10-CM

## 2022-04-28 DIAGNOSIS — Z78.0 POSTMENOPAUSAL: ICD-10-CM

## 2022-04-28 PROBLEM — J06.9 VIRAL UPPER RESPIRATORY TRACT INFECTION: Status: RESOLVED | Noted: 2020-03-04 | Resolved: 2022-04-28

## 2022-04-28 LAB
ALBUMIN SERPL-MCNC: 3.4 G/DL (ref 3.4–5)
ALBUMIN/GLOB SERPL: 1.1 {RATIO} (ref 1–2)
ALP LIVER SERPL-CCNC: 104 U/L
ALT SERPL-CCNC: 25 U/L
ANION GAP SERPL CALC-SCNC: 5 MMOL/L (ref 0–18)
AST SERPL-CCNC: 17 U/L (ref 15–37)
BASOPHILS # BLD AUTO: 0.06 X10(3) UL (ref 0–0.2)
BASOPHILS NFR BLD AUTO: 0.7 %
BILIRUB SERPL-MCNC: 0.6 MG/DL (ref 0.1–2)
BUN BLD-MCNC: 19 MG/DL (ref 7–18)
BUN/CREAT SERPL: 25 (ref 10–20)
CALCIUM BLD-MCNC: 8.8 MG/DL (ref 8.5–10.1)
CARTRIDGE LOT#: ABNORMAL NUMERIC
CHLORIDE SERPL-SCNC: 109 MMOL/L (ref 98–112)
CHOLEST SERPL-MCNC: 134 MG/DL (ref ?–200)
CO2 SERPL-SCNC: 29 MMOL/L (ref 21–32)
CREAT BLD-MCNC: 0.76 MG/DL
CREAT UR-SCNC: 43.9 MG/DL
DEPRECATED RDW RBC AUTO: 45.3 FL (ref 35.1–46.3)
EOSINOPHIL # BLD AUTO: 0.24 X10(3) UL (ref 0–0.7)
EOSINOPHIL NFR BLD AUTO: 2.9 %
ERYTHROCYTE [DISTWIDTH] IN BLOOD BY AUTOMATED COUNT: 14.2 % (ref 11–15)
FASTING PATIENT LIPID ANSWER: YES
FASTING STATUS PATIENT QL REPORTED: YES
GLOBULIN PLAS-MCNC: 3.2 G/DL (ref 2.8–4.4)
GLUCOSE BLD-MCNC: 92 MG/DL (ref 70–99)
HCT VFR BLD AUTO: 38.9 %
HDLC SERPL-MCNC: 54 MG/DL (ref 40–59)
HEMOGLOBIN A1C: 5.9 % (ref 4.3–5.6)
HGB BLD-MCNC: 11.8 G/DL
IMM GRANULOCYTES # BLD AUTO: 0.03 X10(3) UL (ref 0–1)
IMM GRANULOCYTES NFR BLD: 0.4 %
LDLC SERPL CALC-MCNC: 60 MG/DL (ref ?–100)
LYMPHOCYTES # BLD AUTO: 2.09 X10(3) UL (ref 1–4)
LYMPHOCYTES NFR BLD AUTO: 25.1 %
MCH RBC QN AUTO: 26.5 PG (ref 26–34)
MCHC RBC AUTO-ENTMCNC: 30.3 G/DL (ref 31–37)
MCV RBC AUTO: 87.2 FL
MICROALBUMIN UR-MCNC: <0.5 MG/DL
MONOCYTES # BLD AUTO: 0.56 X10(3) UL (ref 0.1–1)
MONOCYTES NFR BLD AUTO: 6.7 %
NEUTROPHILS # BLD AUTO: 5.34 X10 (3) UL (ref 1.5–7.7)
NEUTROPHILS # BLD AUTO: 5.34 X10(3) UL (ref 1.5–7.7)
NEUTROPHILS NFR BLD AUTO: 64.2 %
NONHDLC SERPL-MCNC: 80 MG/DL (ref ?–130)
OSMOLALITY SERPL CALC.SUM OF ELEC: 298 MOSM/KG (ref 275–295)
PLATELET # BLD AUTO: 250 10(3)UL (ref 150–450)
POTASSIUM SERPL-SCNC: 4.5 MMOL/L (ref 3.5–5.1)
PROT SERPL-MCNC: 6.6 G/DL (ref 6.4–8.2)
RBC # BLD AUTO: 4.46 X10(6)UL
SODIUM SERPL-SCNC: 143 MMOL/L (ref 136–145)
TRIGL SERPL-MCNC: 110 MG/DL (ref 30–149)
TSI SER-ACNC: 1.43 MIU/ML (ref 0.36–3.74)
VIT D+METAB SERPL-MCNC: 33.4 NG/ML (ref 30–100)
VLDLC SERPL CALC-MCNC: 16 MG/DL (ref 0–30)
WBC # BLD AUTO: 8.3 X10(3) UL (ref 4–11)

## 2022-04-28 PROCEDURE — 80053 COMPREHEN METABOLIC PANEL: CPT

## 2022-04-28 PROCEDURE — 36415 COLL VENOUS BLD VENIPUNCTURE: CPT

## 2022-04-28 PROCEDURE — 82306 VITAMIN D 25 HYDROXY: CPT

## 2022-04-28 PROCEDURE — 82570 ASSAY OF URINE CREATININE: CPT

## 2022-04-28 PROCEDURE — 85025 COMPLETE CBC W/AUTO DIFF WBC: CPT

## 2022-04-28 PROCEDURE — 82043 UR ALBUMIN QUANTITATIVE: CPT

## 2022-04-28 PROCEDURE — 84443 ASSAY THYROID STIM HORMONE: CPT

## 2022-04-28 PROCEDURE — 80061 LIPID PANEL: CPT

## 2022-04-28 RX ORDER — LOSARTAN POTASSIUM 100 MG/1
100 TABLET ORAL DAILY
Qty: 90 TABLET | Refills: 1 | Status: SHIPPED | OUTPATIENT
Start: 2022-04-28

## 2022-05-06 ENCOUNTER — HOSPITAL ENCOUNTER (OUTPATIENT)
Dept: MAMMOGRAPHY | Age: 67
Discharge: HOME | End: 2022-05-06
Attending: FAMILY MEDICINE
Payer: MEDICARE

## 2022-05-06 ENCOUNTER — HOSPITAL ENCOUNTER (OUTPATIENT)
Dept: BONE DENSITY | Age: 67
Discharge: HOME | End: 2022-05-06
Attending: FAMILY MEDICINE
Payer: MEDICARE

## 2022-05-06 DIAGNOSIS — Z78.0 POSTMENOPAUSAL: ICD-10-CM

## 2022-05-06 DIAGNOSIS — Z12.31 VISIT FOR SCREENING MAMMOGRAM: ICD-10-CM

## 2022-05-06 PROCEDURE — 77067 SCR MAMMO BI INCL CAD: CPT | Performed by: FAMILY MEDICINE

## 2022-05-06 PROCEDURE — 77063 BREAST TOMOSYNTHESIS BI: CPT | Performed by: FAMILY MEDICINE

## 2022-05-06 PROCEDURE — 77080 DXA BONE DENSITY AXIAL: CPT | Performed by: FAMILY MEDICINE

## 2022-05-28 NOTE — TELEPHONE ENCOUNTER
Routed to Dr Lillian Yang per protocol as RN not able to refill NSAIDS products, pls advise, thanks.         Refill passed per [facility] protocol   Requested Prescriptions   Pending Prescriptions Disp Refills    CELECOXIB 200 MG Oral Cap [Pharmacy Med Name: Celecoxib 200 Mg Cap Nort] 90 capsule 0     Sig: TAKE ONE CAPSULE BY MOUTH ONE TIME DAILY        Non-Narcotic Pain Medication Protocol Passed - 5/27/2022 10:16 PM        Passed - Appointment in past 6 or next 3 months

## 2022-05-31 RX ORDER — CELECOXIB 200 MG/1
200 CAPSULE ORAL DAILY
Qty: 90 CAPSULE | Refills: 1 | OUTPATIENT
Start: 2022-05-31

## 2022-05-31 RX ORDER — CELECOXIB 200 MG/1
200 CAPSULE ORAL DAILY
Qty: 90 CAPSULE | Refills: 1 | Status: SHIPPED | OUTPATIENT
Start: 2022-05-31 | End: 2022-10-21

## 2022-06-30 ENCOUNTER — OFFICE VISIT (OUTPATIENT)
Dept: FAMILY MEDICINE CLINIC | Facility: CLINIC | Age: 67
End: 2022-06-30
Payer: MEDICARE

## 2022-06-30 VITALS
BODY MASS INDEX: 40.34 KG/M2 | HEART RATE: 80 BPM | WEIGHT: 257 LBS | SYSTOLIC BLOOD PRESSURE: 131 MMHG | TEMPERATURE: 98 F | HEIGHT: 67 IN | DIASTOLIC BLOOD PRESSURE: 83 MMHG

## 2022-06-30 DIAGNOSIS — R73.03 PREDIABETES: Primary | ICD-10-CM

## 2022-06-30 DIAGNOSIS — I10 ESSENTIAL HYPERTENSION: ICD-10-CM

## 2022-06-30 PROCEDURE — 99213 OFFICE O/P EST LOW 20 MIN: CPT | Performed by: FAMILY MEDICINE

## 2022-06-30 PROCEDURE — 3008F BODY MASS INDEX DOCD: CPT | Performed by: FAMILY MEDICINE

## 2022-06-30 PROCEDURE — 3075F SYST BP GE 130 - 139MM HG: CPT | Performed by: FAMILY MEDICINE

## 2022-06-30 PROCEDURE — 3079F DIAST BP 80-89 MM HG: CPT | Performed by: FAMILY MEDICINE

## 2022-06-30 RX ORDER — OMEPRAZOLE 20 MG/1
20 CAPSULE, DELAYED RELEASE ORAL
Qty: 90 CAPSULE | Refills: 4 | Status: SHIPPED | OUTPATIENT
Start: 2022-06-30 | End: 2023-06-30

## 2022-07-02 NOTE — TELEPHONE ENCOUNTER
LMTCB. PAP reviewed:    ASCUS PAP    HPV high risk is positive     Needs colposcopy          Sven Smiley M.D.   OB/GYN    7/2/2022

## 2022-08-09 RX ORDER — ROSUVASTATIN CALCIUM 5 MG/1
5 TABLET, COATED ORAL NIGHTLY
Qty: 90 TABLET | Refills: 4 | Status: SHIPPED | OUTPATIENT
Start: 2022-08-09 | End: 2023-04-14

## 2022-10-17 RX ORDER — LOSARTAN POTASSIUM 100 MG/1
100 TABLET ORAL DAILY
Qty: 90 TABLET | Refills: 1 | Status: SHIPPED | OUTPATIENT
Start: 2022-10-17 | End: 2023-04-17

## 2022-10-17 NOTE — TELEPHONE ENCOUNTER
Refill passed per [address] protocol.       Requested Prescriptions   Pending Prescriptions Disp Refills    FARXIGA 10 MG Oral Tab [Pharmacy Med Name: Deana Halt 10 Mg Tab Astr] 90 tablet 0     Sig: TAKE ONE TABLET BY MOUTH ONE TIME DAILY        Diabetes Medication Protocol Passed - 10/17/2022  1:30 AM        Passed - Last A1C < 7.5 and within past 6 months     Lab Results   Component Value Date    A1C 5.9 (A) 04/28/2022               Passed - In person appointment or virtual visit in the past 6 mos or appointment in next 3 mos       Recent Outpatient Visits              3 months ago Prediabetes    [address], [address], Kusum Mcgowan MD    Office Visit    5 months ago Essential hypertension    [address], Zion Martins, Ladan Clark MD    Office Visit    1 year ago Chronic pain of left ankle    [address], Kusum Mcgowan MD    Office Visit    1 year ago Essential hypertension    [address], [place] Martins, Ladan Clark MD    Office Visit    2 years ago Viral upper respiratory tract infection    [address], [place] Pham    Office Visit     Future Appointments         Provider Department Appt Notes    In 2 months Ignacia Villanueva MD [address], [address], Jignesh Follow                Passed - [facility] or [facility] > 50     GFR Evaluation  GFRNAA: 83 , resulted on 4/28/2022            Passed - GFR in the past 12 months           LOSARTAN 100 MG Oral Tab [Pharmacy Med Name: Losartan Potassium 100 Mg Tab Camb] 90 tablet 0     Sig: TAKE ONE TABLET BY MOUTH ONE TIME DAILY        Hypertensive Medications Protocol Passed - 10/17/2022  1:30 AM        Passed - In person appointment in the past 12 or next 3 months       Recent Outpatient Visits              3 months ago Kusum Almonte MD    Office Visit    5 months ago Essential hypertension Miryam Ochoa, Tiff Gale MD    Office Visit    1 year ago Chronic pain of left ankle    [place] [employer], [facility], [facility] [address], Tiff Gale MD    Office Visit    1 year ago Essential hypertension    [facility], [facility], Tiff Gale MD    Office Visit    2 years ago Viral upper respiratory tract infection    [place] [employer] [facility], [facility], [place] Palacios    Office Visit     Future Appointments         Provider Department Appt Notes    In 2 months Pablo Singh MD [place] [employer] [facility], [facility], Jignesh Lopez                Passed - Last BP reading less than 140/90     BP Readings from Last 1 Encounters:  06/30/22 : 131/83                Passed - CMP or BMP in past 6 months     Recent Results (from the past 4392 hour(s))   COMP METABOLIC PANEL (14)    Collection Time: 04/28/22  9:22 AM   Result Value Ref Range    Glucose 92 70 - 99 mg/dL    Sodium 143 136 - 145 mmol/L    Potassium 4.5 3.5 - 5.1 mmol/L    Chloride 109 98 - 112 mmol/L    CO2 29.0 21.0 - 32.0 mmol/L    Anion Gap 5 0 - 18 mmol/L    BUN 19 (H) 7 - 18 mg/dL    Creatinine 0.76 0.55 - 1.02 mg/dL    BUN/CREA Ratio 25.0 (H) 10.0 - 20.0    Calcium, Total 8.8 8.5 - 10.1 mg/dL    Calculated Osmolality 298 (H) 275 - 295 mOsm/kg    GFR, Non- 83 >=60    GFR, -American 95 >=60    ALT 25 13 - 56 U/L    AST 17 15 - 37 U/L    Alkaline Phosphatase 104 50 - 130 U/L    Bilirubin, Total 0.6 0.1 - 2.0 mg/dL    Total Protein 6.6 6.4 - 8.2 g/dL    Albumin 3.4 3.4 - 5.0 g/dL    Globulin  3.2 2.8 - 4.4 g/dL    A/G Ratio 1.1 1.0 - 2.0    Patient Fasting for CMP? Yes      *Note: Due to a large number of results and/or encounters for the requested time period, some results have not been displayed. A complete set of results can be found in Results Review.                  Passed - In person appointment or virtual visit in the past 6 months       Recent Outpatient Visits              3 months ago Prediabetes    Ari Mills MD    Office Visit    5 months ago Essential hypertension    [employer], [address], Magdaline Members, Chel Wong MD    Office Visit    1 year ago Chronic pain of left ankle    [place] [employer], [employer], [address], Ari Mayfield MD    Office Visit    1 year ago Essential hypertension    [place] [employer], [address], Frey[employer] Members, Chel Wong MD    Office Visit    2 years ago Viral upper respiratory tract infection    [place] [employer], [address], Jignesh Lizama, APRN    Office Visit     Future Appointments         Provider Department Appt Notes    In 2 months Jayna Aguilar MD [facility], [address], Jignesh Lopez                [facility] or [facility] > 50     GFR Evaluation  GFRNAA: 83 , resulted on 4/28/2022                   Future Appointments         Provider Department Appt Notes    In 2 months Jayna Aguilar MD [facility], [address], [address] [place] [address]             Recent Outpatient Visits              3 months ago Prediabetes    Ari Mills MD    Office Visit    5 months ago Essential hypertension    [employer], [address], [employer] Members, Chel Wong MD    Office Visit    1 year ago Chronic pain of left ankle    [employer], [address], Ari Mayfield MD    Office Visit    1 year ago Essential hypertension    [employer], [address], Ari Mayfield MD    Office Visit    2 years ago Viral upper respiratory tract infection    Nelia Adames, [facility] [address] December, APRN    Office Visit

## 2022-10-18 ENCOUNTER — TELEPHONE (OUTPATIENT)
Dept: FAMILY MEDICINE CLINIC | Facility: CLINIC | Age: 67
End: 2022-10-18

## 2022-10-18 RX ORDER — METFORMIN HYDROCHLORIDE 500 MG/1
500 TABLET, EXTENDED RELEASE ORAL
Qty: 90 TABLET | Refills: 0 | Status: SHIPPED | OUTPATIENT
Start: 2022-10-18

## 2022-10-18 NOTE — TELEPHONE ENCOUNTER
Pt on the phone stating the following medication has gone up in price and will like to be prescribed an alternative. Pt currently on has 5 pills left.  Please advise    dapagliflozin (FARXIGA) 10 MG Oral Tab

## 2022-10-18 NOTE — TELEPHONE ENCOUNTER
Changed to metformin.  Pt will notice more upset stomach at first but should improve within few weeks,

## 2022-11-05 RX ORDER — POTASSIUM CHLORIDE 750 MG/1
TABLET, FILM COATED, EXTENDED RELEASE ORAL
Qty: 180 TABLET | Refills: 0 | Status: SHIPPED | OUTPATIENT
Start: 2022-11-05

## 2022-12-19 ENCOUNTER — OFFICE VISIT (OUTPATIENT)
Dept: FAMILY MEDICINE CLINIC | Facility: CLINIC | Age: 67
End: 2022-12-19
Payer: MEDICARE

## 2022-12-19 ENCOUNTER — LAB ENCOUNTER (OUTPATIENT)
Dept: LAB | Age: 67
End: 2022-12-19
Attending: FAMILY MEDICINE
Payer: MEDICARE

## 2022-12-19 VITALS
WEIGHT: 260 LBS | DIASTOLIC BLOOD PRESSURE: 85 MMHG | BODY MASS INDEX: 40.81 KG/M2 | SYSTOLIC BLOOD PRESSURE: 145 MMHG | HEART RATE: 85 BPM | TEMPERATURE: 98 F | HEIGHT: 67 IN

## 2022-12-19 DIAGNOSIS — E78.5 HYPERLIPIDEMIA, UNSPECIFIED HYPERLIPIDEMIA TYPE: ICD-10-CM

## 2022-12-19 DIAGNOSIS — I10 ESSENTIAL HYPERTENSION: ICD-10-CM

## 2022-12-19 DIAGNOSIS — R73.03 PREDIABETES: ICD-10-CM

## 2022-12-19 DIAGNOSIS — Q89.8 UMBILICAL FISTULA: ICD-10-CM

## 2022-12-19 DIAGNOSIS — S90.852A FOREIGN BODY IN LEFT FOOT, INITIAL ENCOUNTER: ICD-10-CM

## 2022-12-19 DIAGNOSIS — I10 ESSENTIAL HYPERTENSION: Primary | ICD-10-CM

## 2022-12-19 LAB
ALBUMIN SERPL-MCNC: 3.6 G/DL (ref 3.4–5)
ALBUMIN/GLOB SERPL: 1.2 {RATIO} (ref 1–2)
ALP LIVER SERPL-CCNC: 108 U/L
ALT SERPL-CCNC: 24 U/L
ANION GAP SERPL CALC-SCNC: 5 MMOL/L (ref 0–18)
AST SERPL-CCNC: 14 U/L (ref 15–37)
BASOPHILS # BLD AUTO: 0.06 X10(3) UL (ref 0–0.2)
BASOPHILS NFR BLD AUTO: 0.8 %
BILIRUB SERPL-MCNC: 0.6 MG/DL (ref 0.1–2)
BUN BLD-MCNC: 23 MG/DL (ref 7–18)
BUN/CREAT SERPL: 28.8 (ref 10–20)
CALCIUM BLD-MCNC: 9.3 MG/DL (ref 8.5–10.1)
CHLORIDE SERPL-SCNC: 111 MMOL/L (ref 98–112)
CHOLEST SERPL-MCNC: 144 MG/DL (ref ?–200)
CO2 SERPL-SCNC: 29 MMOL/L (ref 21–32)
CREAT BLD-MCNC: 0.8 MG/DL
DEPRECATED RDW RBC AUTO: 46.1 FL (ref 35.1–46.3)
EOSINOPHIL # BLD AUTO: 0.15 X10(3) UL (ref 0–0.7)
EOSINOPHIL NFR BLD AUTO: 2.1 %
ERYTHROCYTE [DISTWIDTH] IN BLOOD BY AUTOMATED COUNT: 14.5 % (ref 11–15)
EST. AVERAGE GLUCOSE BLD GHB EST-MCNC: 134 MG/DL (ref 68–126)
FASTING PATIENT LIPID ANSWER: YES
FASTING STATUS PATIENT QL REPORTED: YES
GFR SERPLBLD BASED ON 1.73 SQ M-ARVRAT: 81 ML/MIN/1.73M2 (ref 60–?)
GLOBULIN PLAS-MCNC: 3.1 G/DL (ref 2.8–4.4)
GLUCOSE BLD-MCNC: 101 MG/DL (ref 70–99)
HBA1C MFR BLD: 6.3 % (ref ?–5.7)
HCT VFR BLD AUTO: 44 %
HDLC SERPL-MCNC: 57 MG/DL (ref 40–59)
HGB BLD-MCNC: 13.1 G/DL
IMM GRANULOCYTES # BLD AUTO: 0.02 X10(3) UL (ref 0–1)
IMM GRANULOCYTES NFR BLD: 0.3 %
LDLC SERPL CALC-MCNC: 70 MG/DL (ref ?–100)
LYMPHOCYTES # BLD AUTO: 2.24 X10(3) UL (ref 1–4)
LYMPHOCYTES NFR BLD AUTO: 30.7 %
MCH RBC QN AUTO: 25.7 PG (ref 26–34)
MCHC RBC AUTO-ENTMCNC: 29.8 G/DL (ref 31–37)
MCV RBC AUTO: 86.4 FL
MONOCYTES # BLD AUTO: 0.55 X10(3) UL (ref 0.1–1)
MONOCYTES NFR BLD AUTO: 7.5 %
NEUTROPHILS # BLD AUTO: 4.28 X10 (3) UL (ref 1.5–7.7)
NEUTROPHILS # BLD AUTO: 4.28 X10(3) UL (ref 1.5–7.7)
NEUTROPHILS NFR BLD AUTO: 58.6 %
NONHDLC SERPL-MCNC: 87 MG/DL (ref ?–130)
OSMOLALITY SERPL CALC.SUM OF ELEC: 304 MOSM/KG (ref 275–295)
PLATELET # BLD AUTO: 257 10(3)UL (ref 150–450)
POTASSIUM SERPL-SCNC: 4.6 MMOL/L (ref 3.5–5.1)
PROT SERPL-MCNC: 6.7 G/DL (ref 6.4–8.2)
RBC # BLD AUTO: 5.09 X10(6)UL
SODIUM SERPL-SCNC: 145 MMOL/L (ref 136–145)
TRIGL SERPL-MCNC: 90 MG/DL (ref 30–149)
TSI SER-ACNC: 0.99 MIU/ML (ref 0.36–3.74)
VLDLC SERPL CALC-MCNC: 14 MG/DL (ref 0–30)
WBC # BLD AUTO: 7.3 X10(3) UL (ref 4–11)

## 2022-12-19 PROCEDURE — 84443 ASSAY THYROID STIM HORMONE: CPT

## 2022-12-19 PROCEDURE — 3079F DIAST BP 80-89 MM HG: CPT | Performed by: FAMILY MEDICINE

## 2022-12-19 PROCEDURE — 1126F AMNT PAIN NOTED NONE PRSNT: CPT | Performed by: FAMILY MEDICINE

## 2022-12-19 PROCEDURE — 3008F BODY MASS INDEX DOCD: CPT | Performed by: FAMILY MEDICINE

## 2022-12-19 PROCEDURE — 85025 COMPLETE CBC W/AUTO DIFF WBC: CPT

## 2022-12-19 PROCEDURE — 36415 COLL VENOUS BLD VENIPUNCTURE: CPT

## 2022-12-19 PROCEDURE — 83036 HEMOGLOBIN GLYCOSYLATED A1C: CPT

## 2022-12-19 PROCEDURE — 80053 COMPREHEN METABOLIC PANEL: CPT

## 2022-12-19 PROCEDURE — 80061 LIPID PANEL: CPT

## 2022-12-19 PROCEDURE — 3077F SYST BP >= 140 MM HG: CPT | Performed by: FAMILY MEDICINE

## 2022-12-19 PROCEDURE — 99214 OFFICE O/P EST MOD 30 MIN: CPT | Performed by: FAMILY MEDICINE

## 2022-12-19 RX ORDER — HYDROCHLOROTHIAZIDE 12.5 MG/1
12.5 TABLET ORAL DAILY
Qty: 90 TABLET | Refills: 3 | Status: SHIPPED | OUTPATIENT
Start: 2022-12-19 | End: 2023-12-14

## 2023-03-02 RX ORDER — CELECOXIB 200 MG/1
200 CAPSULE ORAL DAILY
Qty: 90 CAPSULE | Refills: 3 | Status: SHIPPED | OUTPATIENT
Start: 2023-03-02

## 2023-03-02 NOTE — TELEPHONE ENCOUNTER
Refill passed per [place], [facility] protocol    Requested Prescriptions   Pending Prescriptions Disp Refills    CELECOXIB 200 MG Oral Cap [Pharmacy Med Name: Celecoxib 200 Mg Cap Nort] 90 capsule 0     Sig: Take 1 capsule (200 mg total) by mouth daily.        Non-Narcotic Pain Medication Protocol Passed - 3/1/2023 10:30 PM        Passed - In person appointment or virtual visit in the past 6 mos or appointment in next 3 mos     Recent Outpatient Visits              2 months ago Essential hypertension    Razia Thompson MD    Office Visit    8 months ago Prediabetes    Razia Thompson MD    Office Visit    10 months ago Essential hypertension    Razia Thompson MD    Office Visit    1 year ago Chronic pain of left ankle    Razia Thompson MD    Office Visit    2 years ago Essential hypertension    Narda Thompson, Lexie Worrell MD    Office Visit

## 2023-03-03 ENCOUNTER — TELEPHONE (OUTPATIENT)
Dept: FAMILY MEDICINE CLINIC | Facility: CLINIC | Age: 68
End: 2023-03-03

## 2023-03-03 DIAGNOSIS — Z12.11 SCREENING FOR COLON CANCER: Primary | ICD-10-CM

## 2023-04-17 RX ORDER — LOSARTAN POTASSIUM 100 MG/1
100 TABLET ORAL DAILY
Qty: 90 TABLET | Refills: 3 | Status: SHIPPED | OUTPATIENT
Start: 2023-04-17

## 2023-04-17 NOTE — TELEPHONE ENCOUNTER
Please review. Protocol failed or has no protocol.     Requested Prescriptions   Pending Prescriptions Disp Refills    LOSARTAN 100 MG Oral Tab [Pharmacy Med Name: Losartan Potassium 100 Mg Tab [facility]] 90 tablet 0     Sig: TAKE ONE TABLET BY MOUTH ONE TIME DAILY       Hypertensive Medications Protocol Failed - 4/16/2023  1:33 AM        Failed - Last BP reading less than 140/90     BP Readings from Last 1 Encounters:  12/19/22 : 145/85                Passed - In person appointment in the past 12 or next 3 months     Recent Outpatient Visits              3 months ago Essential hypertension    [address], [address], Pranav Root MD    Office Visit    9 months ago Prediabetes    [facility], Nuha Lu MD    Office Visit    11 months ago Essential hypertension    [address] [facility], Pranav Root MD    Office Visit    1 year ago Chronic pain of left ankle    [address] [facility], Nuha Lu MD    Office Visit    2 years ago Essential hypertension    [address] [facility], Pranav Root MD    Office Visit                      Passed - CMP or BMP in past 6 months     Recent Results (from the past 4392 hour(s))   COMP METABOLIC PANEL (14)    Collection Time: 12/19/22 10:25 AM   Result Value Ref Range    Glucose 101 (H) 70 - 99 mg/dL    Sodium 145 136 - 145 mmol/L    Potassium 4.6 3.5 - 5.1 mmol/L    Chloride 111 98 - 112 mmol/L    CO2 29.0 21.0 - 32.0 mmol/L    Anion Gap 5 0 - 18 mmol/L    BUN 23 (H) 7 - 18 mg/dL    Creatinine 0.80 0.55 - 1.02 mg/dL    BUN/CREA Ratio 28.8 (H) 10.0 - 20.0    Calcium, Total 9.3 8.5 - 10.1 mg/dL    Calculated Osmolality 304 (H) 275 - 295 mOsm/kg    eGFR-Cr 81 >=60 mL/min/1.73m2    ALT 24 13 - 56 U/L    AST 14 (L) 15 - 37 U/L    Alkaline Phosphatase 108 55 - 142 U/L    Bilirubin, Total 0.6 0.1 - 2.0 mg/dL    Total Protein 6.7 6.4 - 8.2 g/dL    Albumin 3.6 3.4 - 5.0 g/dL    Globulin  3.1 2.8 - 4.4 g/dL    A/G Ratio 1.2 1.0 - 2.0    Patient Fasting for CMP? Yes      *Note: Due to a large number of results and/or encounters for the requested time period, some results have not been displayed. A complete set of results can be found in Results Review.                  Passed - In person appointment or virtual visit in the past 6 months     Recent Outpatient Visits              3 months ago Essential hypertension    [address] MD    Office Visit    9 months ago Prediabetes    [address], Jhonny Hernandez MD    Office Visit    11 months ago Essential hypertension    [address], Kelly Salazar MD    Office Visit    1 year ago Chronic pain of left ankle    [address] MD    Office Visit    2 years ago Essential hypertension    [address], Jhonny Hernandez MD    Office Visit                      Passed - [facility] or GFRNAA > 50     GFR Evaluation  EGFRCR: 81 , resulted on 12/19/2022                 Recent Outpatient Visits              3 months ago Essential hypertension    [address] MD    Office Visit    9 months ago Prediabetes    [address] MD    Office Visit    11 months ago Essential hypertension    [address] MD    Office Visit    1 year ago Chronic pain of left ankle    [address] MD    Office Visit    2 years ago Essential hypertension    [address] Cletis Shank, MD    Office Visit

## 2023-07-05 RX ORDER — METFORMIN HYDROCHLORIDE 500 MG/1
500 TABLET, EXTENDED RELEASE ORAL
Qty: 90 TABLET | Refills: 4 | Status: SHIPPED | OUTPATIENT
Start: 2023-07-05

## 2023-07-05 NOTE — TELEPHONE ENCOUNTER
Please review. Protocol failed / No protocol.     Requested Prescriptions   Pending Prescriptions Disp Refills    METFORMIN  MG Oral Tablet 24 Hr [Pharmacy Med Name: Metformin Hydrochloride Er 24hr 500 Mg Tab Gran] 90 tablet 0     Sig: Take 1 tablet (500 mg total) by mouth daily with breakfast.       Diabetes Medication Protocol Failed - 7/5/2023  3:53 PM        Failed - Last A1C < 7.5 and within past 6 months     Lab Results   Component Value Date    A1C 6.3 (H) 12/19/2022             Passed - In person appointment or virtual visit in the past 6 mos or appointment in next 3 mos     Recent Outpatient Visits              6 months ago Essential hypertension    Rich Amezcua, Braden Bassett MD    Office Visit    1 year ago Prediabetes    Ignacio Morris, Braden Bassett MD    Office Visit    1 year ago Essential hypertension    Naeem Solis Viviann Henry, MD    Office Visit    2 years ago Chronic pain of left ankle    Braden Solis MD    Office Visit    2 years ago Essential hypertension    Naeem Solis, Tiff Kaufman MD    Office Visit          Future Appointments         Provider Department Appt Notes    In 1 month MD Ingacio Beltran [place] mammogram, possible pap, physical               Passed - EGFRCR or GFRNAA > 50     GFR Evaluation  EGFRCR: 81 , resulted on 12/19/2022          Passed - GFR in the past 12 months              Recent Outpatient Visits              6 months ago Essential hypertension    [address] Amezcua, Braden Bassett MD    Office Visit    1 year ago Prediabetes    Braden Solis MD    Office Visit    1 year ago Essential hypertension Jorge Luis Jordan MD    Office Visit    2 years ago Chronic pain of left ankle    Jorge Luis Jordan MD    Office Visit    2 years ago Essential hypertension    Nichole Soto, Kevin Gilliland, Mary Deluca MD    Office Visit             Future Appointments         Provider Department Appt Notes    In 1 month MD Nichole Sen [place] mammogram, possible pap, physical

## 2023-08-05 RX ORDER — POTASSIUM CHLORIDE 750 MG/1
20 TABLET, FILM COATED, EXTENDED RELEASE ORAL DAILY
Qty: 180 TABLET | Refills: 4 | Status: SHIPPED | OUTPATIENT
Start: 2023-08-05

## 2023-08-05 NOTE — TELEPHONE ENCOUNTER
LAST VISIT 12/19/2022; Future Appointments   Date Time Provider Stella Baltazari   8/9/2023  1:00 PM Holly Marion MD [facility]           Component  Ref Range & Units 12/19/22 10:25 AM   Glucose  70 - 99 mg/dL 101 High    Sodium  136 - 145 mmol/L 145   Potassium  3.5 - 5.1 mmol/L 4.6              Please review; protocol failed/no protocol.      Requested Prescriptions   Pending Prescriptions Disp Refills    POTASSIUM CHLORIDE ER 10 MEQ Oral Tab CR [Pharmacy Med Name: Potassium Chloride Er 10 Meq Tab Adina] 180 tablet 0     Sig: TAKE TWO TABLETS BY MOUTH DAILY       There is no refill protocol information for this order           Recent Outpatient Visits              7 months ago Essential hypertension    [address], [place], Mariela Li MD    Office Visit    1 year ago Prediabetes    [address] [facility], Mariela Li MD    Office Visit    1 year ago Essential hypertension    [address] [facility], Mariela Li MD    Office Visit    2 years ago Chronic pain of left ankle    [address] [facility], Mariela Li MD    Office Visit    2 years ago Essential hypertension    [address] [facility], Jovanni Gibson MD    Office Visit             Future Appointments         Provider Department Appt Notes    In 5 days Holly Marion MD [address], [address], [place] mammogram, possible pap, physical

## 2023-08-09 ENCOUNTER — HOSPITAL ENCOUNTER (OUTPATIENT)
Dept: GENERAL RADIOLOGY | Age: 68
Discharge: HOME | End: 2023-08-09
Attending: FAMILY MEDICINE
Payer: MEDICARE

## 2023-08-09 ENCOUNTER — LAB ENCOUNTER (OUTPATIENT)
Dept: LAB | Age: 68
End: 2023-08-09
Attending: FAMILY MEDICINE
Payer: MEDICARE

## 2023-08-09 ENCOUNTER — OFFICE VISIT (OUTPATIENT)
Dept: FAMILY MEDICINE CLINIC | Facility: CLINIC | Age: 68
End: 2023-08-09

## 2023-08-09 VITALS
BODY MASS INDEX: 40.91 KG/M2 | HEIGHT: 67 IN | SYSTOLIC BLOOD PRESSURE: 142 MMHG | DIASTOLIC BLOOD PRESSURE: 82 MMHG | HEART RATE: 73 BPM | WEIGHT: 260.63 LBS

## 2023-08-09 DIAGNOSIS — E55.9 VITAMIN D DEFICIENCY: ICD-10-CM

## 2023-08-09 DIAGNOSIS — R73.03 PREDIABETES: ICD-10-CM

## 2023-08-09 DIAGNOSIS — E78.5 HYPERLIPIDEMIA, UNSPECIFIED HYPERLIPIDEMIA TYPE: Primary | ICD-10-CM

## 2023-08-09 DIAGNOSIS — I10 ESSENTIAL HYPERTENSION: ICD-10-CM

## 2023-08-09 DIAGNOSIS — M79.604 LEG PAIN, RIGHT: ICD-10-CM

## 2023-08-09 DIAGNOSIS — M79.672 LEFT FOOT PAIN: ICD-10-CM

## 2023-08-09 DIAGNOSIS — Z00.00 ENCOUNTER FOR ANNUAL HEALTH EXAMINATION: ICD-10-CM

## 2023-08-09 DIAGNOSIS — E66.01 OBESITY, MORBID (HCC): ICD-10-CM

## 2023-08-09 DIAGNOSIS — Z12.31 VISIT FOR SCREENING MAMMOGRAM: ICD-10-CM

## 2023-08-09 LAB
ALBUMIN SERPL-MCNC: 3.9 G/DL (ref 3.4–5)
ALBUMIN/GLOB SERPL: 1.1 {RATIO} (ref 1–2)
ALP LIVER SERPL-CCNC: 96 U/L
ALT SERPL-CCNC: 36 U/L
ANION GAP SERPL CALC-SCNC: 4 MMOL/L (ref 0–18)
AST SERPL-CCNC: 22 U/L (ref 15–37)
BASOPHILS # BLD AUTO: 0.04 X10(3) UL (ref 0–0.2)
BASOPHILS NFR BLD AUTO: 0.6 %
BILIRUB SERPL-MCNC: 1.1 MG/DL (ref 0.1–2)
BUN BLD-MCNC: 16 MG/DL (ref 7–18)
CALCIUM BLD-MCNC: 9.4 MG/DL (ref 8.5–10.1)
CHLORIDE SERPL-SCNC: 106 MMOL/L (ref 98–112)
CHOLEST SERPL-MCNC: 162 MG/DL (ref ?–200)
CO2 SERPL-SCNC: 29 MMOL/L (ref 21–32)
CREAT BLD-MCNC: 0.77 MG/DL
CREAT UR-SCNC: 56.7 MG/DL
EGFRCR SERPLBLD CKD-EPI 2021: 85 ML/MIN/1.73M2 (ref 60–?)
EOSINOPHIL # BLD AUTO: 0.14 X10(3) UL (ref 0–0.7)
EOSINOPHIL NFR BLD AUTO: 2.2 %
ERYTHROCYTE [DISTWIDTH] IN BLOOD BY AUTOMATED COUNT: 14.1 %
FASTING PATIENT LIPID ANSWER: YES
FASTING STATUS PATIENT QL REPORTED: YES
GLOBULIN PLAS-MCNC: 3.6 G/DL (ref 2.8–4.4)
GLUCOSE BLD-MCNC: 97 MG/DL (ref 70–99)
HCT VFR BLD AUTO: 44 %
HDLC SERPL-MCNC: 53 MG/DL (ref 40–59)
HGB BLD-MCNC: 13.7 G/DL
IMM GRANULOCYTES # BLD AUTO: 0.02 X10(3) UL (ref 0–1)
IMM GRANULOCYTES NFR BLD: 0.3 %
LDLC SERPL CALC-MCNC: 82 MG/DL (ref ?–100)
LYMPHOCYTES # BLD AUTO: 2.07 X10(3) UL (ref 1–4)
LYMPHOCYTES NFR BLD AUTO: 33.2 %
MCH RBC QN AUTO: 27.1 PG (ref 26–34)
MCHC RBC AUTO-ENTMCNC: 31.1 G/DL (ref 31–37)
MCV RBC AUTO: 87.1 FL
MICROALBUMIN UR-MCNC: 0.72 MG/DL
MICROALBUMIN/CREAT 24H UR-RTO: 12.7 UG/MG (ref ?–30)
MONOCYTES # BLD AUTO: 0.48 X10(3) UL (ref 0.1–1)
MONOCYTES NFR BLD AUTO: 7.7 %
NEUTROPHILS # BLD AUTO: 3.49 X10 (3) UL (ref 1.5–7.7)
NEUTROPHILS # BLD AUTO: 3.49 X10(3) UL (ref 1.5–7.7)
NEUTROPHILS NFR BLD AUTO: 56 %
NONHDLC SERPL-MCNC: 109 MG/DL (ref ?–130)
OSMOLALITY SERPL CALC.SUM OF ELEC: 289 MOSM/KG (ref 275–295)
PLATELET # BLD AUTO: 233 10(3)UL (ref 150–450)
POTASSIUM SERPL-SCNC: 4.3 MMOL/L (ref 3.5–5.1)
PROT SERPL-MCNC: 7.5 G/DL (ref 6.4–8.2)
RBC # BLD AUTO: 5.05 X10(6)UL
SODIUM SERPL-SCNC: 139 MMOL/L (ref 136–145)
TRIGL SERPL-MCNC: 157 MG/DL (ref 30–149)
TSI SER-ACNC: 0.93 MIU/ML (ref 0.36–3.74)
VIT B12 SERPL-MCNC: 1473 PG/ML (ref 193–986)
VIT D+METAB SERPL-MCNC: 30.8 NG/ML (ref 30–100)
VLDLC SERPL CALC-MCNC: 25 MG/DL (ref 0–30)
WBC # BLD AUTO: 6.2 X10(3) UL (ref 4–11)

## 2023-08-09 PROCEDURE — 1125F AMNT PAIN NOTED PAIN PRSNT: CPT | Performed by: FAMILY MEDICINE

## 2023-08-09 PROCEDURE — 84443 ASSAY THYROID STIM HORMONE: CPT

## 2023-08-09 PROCEDURE — 83036 HEMOGLOBIN GLYCOSYLATED A1C: CPT

## 2023-08-09 PROCEDURE — 36415 COLL VENOUS BLD VENIPUNCTURE: CPT

## 2023-08-09 PROCEDURE — 3079F DIAST BP 80-89 MM HG: CPT | Performed by: FAMILY MEDICINE

## 2023-08-09 PROCEDURE — 80061 LIPID PANEL: CPT

## 2023-08-09 PROCEDURE — 85025 COMPLETE CBC W/AUTO DIFF WBC: CPT

## 2023-08-09 PROCEDURE — 82306 VITAMIN D 25 HYDROXY: CPT

## 2023-08-09 PROCEDURE — 1170F FXNL STATUS ASSESSED: CPT | Performed by: FAMILY MEDICINE

## 2023-08-09 PROCEDURE — 96160 PT-FOCUSED HLTH RISK ASSMT: CPT | Performed by: FAMILY MEDICINE

## 2023-08-09 PROCEDURE — 82570 ASSAY OF URINE CREATININE: CPT

## 2023-08-09 PROCEDURE — 80053 COMPREHEN METABOLIC PANEL: CPT

## 2023-08-09 PROCEDURE — 82043 UR ALBUMIN QUANTITATIVE: CPT

## 2023-08-09 PROCEDURE — 82607 VITAMIN B-12: CPT

## 2023-08-09 PROCEDURE — 3077F SYST BP >= 140 MM HG: CPT | Performed by: FAMILY MEDICINE

## 2023-08-09 PROCEDURE — 99214 OFFICE O/P EST MOD 30 MIN: CPT | Performed by: FAMILY MEDICINE

## 2023-08-09 PROCEDURE — 3008F BODY MASS INDEX DOCD: CPT | Performed by: FAMILY MEDICINE

## 2023-08-09 PROCEDURE — 73562 X-RAY EXAM OF KNEE 3: CPT | Performed by: FAMILY MEDICINE

## 2023-08-09 PROCEDURE — 73630 X-RAY EXAM OF FOOT: CPT | Performed by: FAMILY MEDICINE

## 2023-08-09 PROCEDURE — G0438 PPPS, INITIAL VISIT: HCPCS | Performed by: FAMILY MEDICINE

## 2023-08-09 RX ORDER — CYCLOBENZAPRINE HCL 5 MG
5 TABLET ORAL NIGHTLY
Qty: 30 TABLET | Refills: 0 | Status: SHIPPED | OUTPATIENT
Start: 2023-08-09

## 2023-08-09 RX ORDER — FUROSEMIDE 20 MG/1
20 TABLET ORAL DAILY
Qty: 90 TABLET | Refills: 2 | Status: SHIPPED | OUTPATIENT
Start: 2023-08-09

## 2023-08-10 LAB
EST. AVERAGE GLUCOSE BLD GHB EST-MCNC: 137 MG/DL (ref 68–126)
HBA1C MFR BLD: 6.4 % (ref ?–5.7)

## 2023-08-12 NOTE — PROGRESS NOTES
Significant arthritis in your knee and foot causing the pain.  See podiatrist as discussed and maybe ortho for your knee in future if not improving. - Dr. Rosen Fam

## 2023-08-12 NOTE — PROGRESS NOTES
Diabetes and cholesterol are stable on current medications. A1C is increasing each time over the past few years. May want to consider one of the injectable medications like Mounjaro for diabetes.  Will discuss in future.  - Dr. Mare Zuñiga

## 2023-08-12 NOTE — PROGRESS NOTES
Significant arthritis in your knee and foot causing the pain.  See podiatrist as discussed and maybe ortho for your knee in future if not improving. - Dr. Thomas Client

## 2023-08-23 ENCOUNTER — NURSE ONLY (OUTPATIENT)
Dept: FAMILY MEDICINE CLINIC | Facility: CLINIC | Age: 68
End: 2023-08-23

## 2023-08-23 VITALS — HEART RATE: 79 BPM | SYSTOLIC BLOOD PRESSURE: 132 MMHG | DIASTOLIC BLOOD PRESSURE: 76 MMHG

## 2023-08-23 PROCEDURE — 3075F SYST BP GE 130 - 139MM HG: CPT | Performed by: FAMILY MEDICINE

## 2023-08-23 PROCEDURE — 3078F DIAST BP <80 MM HG: CPT | Performed by: FAMILY MEDICINE

## 2023-08-23 NOTE — PROGRESS NOTES
Patient is here for blood pressure check. Verified full name and . Confirmed patient is taking losartan 100mg First BP readin/87 Second BP readin/76. PCP was notified. Patient advised of PCP instructions: continue with treatment plan and keep 6 month follow up. Patient verbalized understanding.

## 2023-09-05 RX ORDER — OMEPRAZOLE 20 MG/1
20 CAPSULE, DELAYED RELEASE ORAL
Qty: 90 CAPSULE | Refills: 4 | Status: SHIPPED | OUTPATIENT
Start: 2023-09-05 | End: 2024-09-04

## 2023-09-05 NOTE — TELEPHONE ENCOUNTER
Refill passed per [employer], [facility] protocol but not refilled as this was on the  medlist.     Requested Prescriptions   Pending Prescriptions Disp Refills    OMEPRAZOLE 20 MG Oral Capsule Delayed Release [Pharmacy Med Name: Omeprazole Dr 20 Mg Cap Nort] 90 capsule 0     Sig: Take 1 capsule (20 mg total) by mouth every morning before breakfast.       Gastrointestional Medication Protocol Passed - 2023  1:31 AM        Passed - In person appointment or virtual visit in the past 12 mos or appointment in next 3 mos     Recent Outpatient Visits              1 week ago     [facility]    Nurse Only    3 weeks ago Hyperlipidemia, unspecified hyperlipidemia type    [facility]asha Du[facility]    Office Visit    8 months ago Essential hypertension    [address] [place], [place] [address]haily, Masha Duque MD    Office Visit    1 year ago Prediabetes    [address] [place] [address] Bernard, Masha Duque MD    Office Visit    1 year ago Essential hypertension    [address] [place] [address], Zaid Wong MD    Office Visit          Future Appointments         Provider Department Appt Notes    In 3 days Roselyn Augustin, [address], [address], Jignesh Left foot pain    In 4 weeks ADO DEXA RM1; ADO NICHOLAS [facility]     In 5 months Brooks Whiteside MD [address] [facility]                    [email or web address]      @[facility] [email or web address]@

## 2023-09-06 RX ORDER — CYCLOBENZAPRINE HCL 5 MG
5 TABLET ORAL NIGHTLY
Qty: 90 TABLET | Refills: 0 | Status: SHIPPED | OUTPATIENT
Start: 2023-09-06

## 2023-09-06 NOTE — TELEPHONE ENCOUNTER
Please review; protocol failed. Last office visit 08/09/2023 and  Last refill     Requested Prescriptions   Pending Prescriptions Disp Refills    CYCLOBENZAPRINE 5 MG Oral Tab [Pharmacy Med Name: Cyclobenzaprine Hydrochloride 5 Mg Tab [facility]] 30 tablet 0     Sig: Take 1 tablet (5 mg total) by mouth nightly.        There is no refill protocol information for this order        Recent Outpatient Visits              2 weeks ago     [facility], Jignesh    Nurse Only    4 weeks ago Hyperlipidemia, unspecified hyperlipidemia type    [facility], Bhavna Alonzo MD    Office Visit    8 months ago Essential hypertension    [address] [place] [address], Bhavna Alonzo MD    Office Visit    1 year ago Prediabetes    [address] [place] [address], Bhavna Alonzo MD    Office Visit    1 year ago Essential hypertension    [address] [place] [address], Zana Martyr, Camillia Aase, MD    Office Visit          Future Appointments         Provider Department Appt Notes    In 2 days [address], [address], [address], Jignesh Left foot pain    In 4 weeks ADO DEXA RM1; ADO NICHOLAS [facility]     In 5 months Abiodun Evans MD [address] [facility], Jignesh Follow up

## 2023-09-08 ENCOUNTER — OFFICE VISIT (OUTPATIENT)
Dept: PODIATRY CLINIC | Facility: CLINIC | Age: 68
End: 2023-09-08

## 2023-09-08 DIAGNOSIS — M67.472 GANGLION CYST OF LEFT FOOT: Primary | ICD-10-CM

## 2023-09-08 PROCEDURE — 1159F MED LIST DOCD IN RCRD: CPT | Performed by: STUDENT IN AN ORGANIZED HEALTH CARE EDUCATION/TRAINING PROGRAM

## 2023-09-08 PROCEDURE — 1126F AMNT PAIN NOTED NONE PRSNT: CPT | Performed by: STUDENT IN AN ORGANIZED HEALTH CARE EDUCATION/TRAINING PROGRAM

## 2023-09-08 PROCEDURE — 99203 OFFICE O/P NEW LOW 30 MIN: CPT | Performed by: STUDENT IN AN ORGANIZED HEALTH CARE EDUCATION/TRAINING PROGRAM

## 2023-09-08 NOTE — PROGRESS NOTES
[facility], [facility] Podiatry  Progress Note      Miriam Breen is a 78year old female. Patient presents with: Foot Pain: Consult- L foot- cyst- onset- 6 mos ago- no injury- rates pain 9-10/10 most of  the time- has swelling- pt is borderline diabetic-last AIC= 6.4 on 2023- pt is on metformin-do not check BS daily- has xray in the system- was seen by Dr. Amy Baird in 2018            HPI:     Patient is a pleasant 23-year-old female who presents to clinic today for evaluation of a soft tissue mass on the dorsal aspect of the left foot. Patient admits to pain off-and-on with pressure or different shoes. Allergies: Patient has no known allergies. Current Outpatient Medications   Medication Sig Dispense Refill    cyclobenzaprine 5 MG Oral Tab Take 1 tablet (5 mg total) by mouth nightly. 90 tablet 0    omeprazole 20 MG Oral Capsule Delayed Release Take 1 capsule (20 mg total) by mouth every morning before breakfast. 90 capsule 4    furosemide (LASIX) 20 MG Oral Tab Take 1 tablet (20 mg total) by mouth daily. 90 tablet 2    Potassium Chloride ER 10 MEQ Oral Tab CR Take 2 tablets (20 mEq total) by mouth daily. 180 tablet 4    metFORMIN  MG Oral Tablet 24 Hr Take 1 tablet (500 mg total) by mouth daily with breakfast. 90 tablet 4    losartan 100 MG Oral Tab Take 1 tablet (100 mg total) by mouth daily. 90 tablet 3    rosuvastatin 5 MG Oral Tab Take 1 tablet (5 mg total) by mouth nightly.  100 tablet 3      Past Medical History:   Diagnosis Date    High blood pressure     Ovarian cyst     Pneumonia due to organism     Unspecified essential hypertension     Visual impairment     wears prescription glasses      Past Surgical History:   Procedure Laterality Date          REMOVAL OF OVARIAN CYST(S)        Family History   Problem Relation Age of Onset    Heart Disease Father         cause of death    Heart Disorder Mother     Kidney Disease Mother     Stroke Mother         CVA (Stroke)      Social History Socioeconomic History      Marital status: Single    Tobacco Use      Smoking status: Former        Types: Cigarettes        Quit date: 2012        Years since quittin.6      Smokeless tobacco: Never    Vaping Use      Vaping Use: Never used    Substance and Sexual Activity      Alcohol use: Yes        Alcohol/week: 0.0 standard drinks of alcohol        Comment: rarely      Drug use: No    Other Topics      Concerns:        Caffeine Concern: Yes          Coffee, 1 cup daily          REVIEW OF SYSTEMS:     Denies nause, fever, chills  No calf pain  Denies chest pain or SOB      EXAM:   There were no vitals taken for this visit. GENERAL: well developed, well nourished, in no apparent distress  EXTREMITIES:   1. Integument: Normal skin temperature and turgor. Palpable and freely movable soft tissue mass on the dorsal proximal midfoot. 2. Vascular: Dorsalis pedis two out of four bilateral and posterior tibial pulses two out of   four bilateral, capillary refill normal.   3. Musculoskeletal: All muscle groups are graded 5 out of 5 in the foot and ankle. Tenderness with palpation to soft tissue mass of left foot. 4. Neurological: Normal sharp dull sensation; reflexes normal.             ASSESSMENT AND PLAN:   Diagnoses and all orders for this visit:    Ganglion cyst of left foot  -     US EXTREMITY NONVASCULAR  (MCB=08197); Future        Plan:       Patient seen and examined and findings discussed with patient. Order placed for ultrasound to confirm soft tissue mass. If ganglion cyst patient would like to proceed with aspiration and injection at next podiatry visit once the ultrasound is completed. The patient indicates understanding of these issues and agrees to the plan.         Rodrigo Sheehan DPM

## 2023-10-04 ENCOUNTER — HOSPITAL ENCOUNTER (OUTPATIENT)
Dept: MAMMOGRAPHY | Age: 68
Discharge: HOME | End: 2023-10-04
Attending: FAMILY MEDICINE
Payer: MEDICARE

## 2023-10-04 DIAGNOSIS — Z00.00 ENCOUNTER FOR ANNUAL HEALTH EXAMINATION: ICD-10-CM

## 2023-10-04 PROCEDURE — 77063 BREAST TOMOSYNTHESIS BI: CPT | Performed by: FAMILY MEDICINE

## 2023-10-04 PROCEDURE — 77067 SCR MAMMO BI INCL CAD: CPT | Performed by: FAMILY MEDICINE

## 2023-11-02 ENCOUNTER — HOSPITAL ENCOUNTER (OUTPATIENT)
Dept: ULTRASOUND IMAGING | Facility: HOSPITAL | Age: 68
Discharge: HOME | End: 2023-11-02
Attending: STUDENT IN AN ORGANIZED HEALTH CARE EDUCATION/TRAINING PROGRAM
Payer: MEDICARE

## 2023-11-02 DIAGNOSIS — M67.472 GANGLION CYST OF LEFT FOOT: ICD-10-CM

## 2023-11-02 PROCEDURE — 76881 US COMPL JOINT R-T W/IMG: CPT | Performed by: STUDENT IN AN ORGANIZED HEALTH CARE EDUCATION/TRAINING PROGRAM

## 2023-11-24 ENCOUNTER — OFFICE VISIT (OUTPATIENT)
Dept: PODIATRY CLINIC | Facility: CLINIC | Age: 68
End: 2023-11-24
Payer: MEDICARE

## 2023-11-24 DIAGNOSIS — I83.893 VARICOSE VEINS OF BOTH LEGS WITH EDEMA: Primary | ICD-10-CM

## 2023-11-24 PROCEDURE — 1159F MED LIST DOCD IN RCRD: CPT | Performed by: STUDENT IN AN ORGANIZED HEALTH CARE EDUCATION/TRAINING PROGRAM

## 2023-11-24 PROCEDURE — 99213 OFFICE O/P EST LOW 20 MIN: CPT | Performed by: STUDENT IN AN ORGANIZED HEALTH CARE EDUCATION/TRAINING PROGRAM

## 2023-11-24 NOTE — PROGRESS NOTES
[facility], [facility] Podiatry  Progress Note      Todd Archibald is a 78year old female. Chief Complaint   Patient presents with    Follow - Up     F/u Left foot Cyst, here for US results. On 2023 A1C=6.4, patient does not check BS.             HPI:     Pt is a pleasant 78year old female who PTC for left ultrasound results. Admits to pain on the palpable prominence of left foot    Allergies: Patient has no known allergies. Current Outpatient Medications   Medication Sig Dispense Refill    cyclobenzaprine 5 MG Oral Tab Take 1 tablet (5 mg total) by mouth nightly. 90 tablet 0    omeprazole 20 MG Oral Capsule Delayed Release Take 1 capsule (20 mg total) by mouth every morning before breakfast. 90 capsule 4    furosemide (LASIX) 20 MG Oral Tab Take 1 tablet (20 mg total) by mouth daily. 90 tablet 2    Potassium Chloride ER 10 MEQ Oral Tab CR Take 2 tablets (20 mEq total) by mouth daily. 180 tablet 4    metFORMIN  MG Oral Tablet 24 Hr Take 1 tablet (500 mg total) by mouth daily with breakfast. 90 tablet 4    losartan 100 MG Oral Tab Take 1 tablet (100 mg total) by mouth daily. 90 tablet 3    rosuvastatin 5 MG Oral Tab Take 1 tablet (5 mg total) by mouth nightly.  100 tablet 3      Past Medical History:   Diagnosis Date    High blood pressure     Ovarian cyst     Pneumonia due to organism     Unspecified essential hypertension     Visual impairment     wears prescription glasses      Past Surgical History:   Procedure Laterality Date          REMOVAL OF OVARIAN CYST(S)        Family History   Problem Relation Age of Onset    Heart Disease Father         cause of death    Heart Disorder Mother     Kidney Disease Mother     Stroke Mother         CVA (Stroke)      Social History     Socioeconomic History    Marital status: Single   Tobacco Use    Smoking status: Former     Types: Cigarettes     Quit date: 2012     Years since quittin.8    Smokeless tobacco: Never   Vaping Use    Vaping Use: Never used   Substance and Sexual Activity    Alcohol use: Yes     Alcohol/week: 0.0 standard drinks of alcohol     Comment: rarely    Drug use: No   Other Topics Concern    Caffeine Concern Yes     Comment: Coffee, 1 cup daily           REVIEW OF SYSTEMS:     Denies nause, fever, chills  No calf pain  Denies chest pain or SOB      EXAM:   There were no vitals taken for this visit. GENERAL: well developed, well nourished, in no apparent distress  EXTREMITIES:   1. Integument: Normal skin temperature and turgor. Palpable prominence of left proximal midfoot  2. Vascular: Dorsalis pedis two out of four bilateral and posterior tibial pulses two out of   four bilateral, capillary refill normal.   3. Musculoskeletal: Pain with palpation to left midfoot    4. Neurological: Normal sharp dull sensation; reflexes normal.             ASSESSMENT AND PLAN:   Diagnoses and all orders for this visit:    Varicose veins of both legs with edema  -     Vascular Surgery - In Network        Plan:     Discussed the importance of supportive shoe gear and limited barefoot walking. Discussed possible steroid injection. Discussed supportive shoes with alternating shoe lace patterns   Discussed possible oral steroids if patient is not diabetic, otherwise use of NSAIDS if no history of GERD or stomach upset. Recommend cryotherapy/icing. Discussed the importance of rest and the need for immobilization. Discussed potential need for surgical intervention if conservative management fails to resolve symptoms. The patient indicates understanding of these issues and agrees to the plan.         Socrates Silvestre DPM

## 2023-12-05 RX ORDER — CYCLOBENZAPRINE HCL 5 MG
5 TABLET ORAL NIGHTLY
Qty: 90 TABLET | Refills: 0 | Status: SHIPPED | OUTPATIENT
Start: 2023-12-05

## 2023-12-05 NOTE — TELEPHONE ENCOUNTER
Please review; protocol failed. Requested Prescriptions   Pending Prescriptions Disp Refills    CYCLOBENZAPRINE 5 MG Oral Tab [Pharmacy Med Name: Cyclobenzaprine Hydrochloride 5 Mg Tab Unic] 90 tablet 0     Sig: Take 1 tablet (5 mg total) by mouth nightly.        There is no refill protocol information for this order        Recent Outpatient Visits              1 week ago Varicose veins of both legs with edema    [facility], [place] [address], [place], [place]    Office Visit    2 months ago Ganglion cyst of left foot    [facility], [place] [address], [place], [place]    Office Visit    3 months ago     Jignesh Ocasio    Nurse Only    3 months ago Hyperlipidemia, unspecified hyperlipidemia type    Sam Ocasio MD    Office Visit    11 months ago Essential hypertension    Myriam Ocasio Jerone Ruths, MD    Office Visit          Future Appointments         Provider Department Appt Notes    In 2 months MD Marjorie Rowley [place] Follow up

## 2023-12-14 ENCOUNTER — OFFICE VISIT (OUTPATIENT)
Dept: FAMILY MEDICINE CLINIC | Facility: CLINIC | Age: 68
End: 2023-12-14
Payer: MEDICARE

## 2023-12-14 ENCOUNTER — NURSE TRIAGE (OUTPATIENT)
Dept: FAMILY MEDICINE CLINIC | Facility: CLINIC | Age: 68
End: 2023-12-14

## 2023-12-14 VITALS
TEMPERATURE: 99 F | HEIGHT: 67 IN | HEART RATE: 99 BPM | SYSTOLIC BLOOD PRESSURE: 149 MMHG | DIASTOLIC BLOOD PRESSURE: 74 MMHG | WEIGHT: 259 LBS | BODY MASS INDEX: 40.65 KG/M2

## 2023-12-14 DIAGNOSIS — H66.92 ACUTE LEFT OTITIS MEDIA: ICD-10-CM

## 2023-12-14 DIAGNOSIS — J34.89 RHINORRHEA: ICD-10-CM

## 2023-12-14 DIAGNOSIS — H92.09 EARACHE: ICD-10-CM

## 2023-12-14 DIAGNOSIS — R05.9 COUGH, UNSPECIFIED TYPE: Primary | ICD-10-CM

## 2023-12-14 PROCEDURE — 99214 OFFICE O/P EST MOD 30 MIN: CPT

## 2023-12-14 RX ORDER — AMOXICILLIN AND CLAVULANATE POTASSIUM 875; 125 MG/1; MG/1
1 TABLET, FILM COATED ORAL 2 TIMES DAILY
Qty: 20 TABLET | Refills: 0 | Status: SHIPPED | OUTPATIENT
Start: 2023-12-14 | End: 2023-12-24

## 2023-12-14 RX ORDER — BENZONATATE 100 MG/1
100 CAPSULE ORAL 3 TIMES DAILY PRN
Qty: 30 CAPSULE | Refills: 0 | Status: SHIPPED | OUTPATIENT
Start: 2023-12-14

## 2023-12-14 NOTE — TELEPHONE ENCOUNTER
[]  Critical Lab, Recommendations Needed  [] Need Additional Advice  []   FYI    []   Need Orders  [] Need Medications Sent to Pharmacy  []  Other     SUMMARY: Pt reports sore throat, ear ache, congestion, and cough. Started yesterday. Pt has tried over the counter medications. Pt requesting for a prescription. Pt advised appointment for evaluation. Pt agreed and scheduled with LEONA Howell today.   Future Appointments   Date Time Provider [facility]   12/14/2023  2:50 PM LEONA Porter [facility]   2/14/2024  9:15 AM Abiodun Evans MD [facility]         Reason for call: Sore Throat (Ear ache- sore throat, yesterday, slight cough)  Onset: Data Unavailable                       Reason for Disposition   Earache    Protocols used: Common Cold-A-OH

## 2024-02-14 ENCOUNTER — OFFICE VISIT (OUTPATIENT)
Dept: FAMILY MEDICINE CLINIC | Facility: CLINIC | Age: 69
End: 2024-02-14
Payer: MEDICARE

## 2024-02-14 VITALS
SYSTOLIC BLOOD PRESSURE: 135 MMHG | WEIGHT: 264.63 LBS | DIASTOLIC BLOOD PRESSURE: 84 MMHG | BODY MASS INDEX: 41 KG/M2 | HEART RATE: 96 BPM

## 2024-02-14 DIAGNOSIS — E78.5 HYPERLIPIDEMIA, UNSPECIFIED HYPERLIPIDEMIA TYPE: Primary | ICD-10-CM

## 2024-02-14 DIAGNOSIS — E11.9 CONTROLLED TYPE 2 DIABETES MELLITUS WITHOUT COMPLICATION, WITHOUT LONG-TERM CURRENT USE OF INSULIN (HCC): ICD-10-CM

## 2024-02-14 DIAGNOSIS — E55.9 VITAMIN D DEFICIENCY: ICD-10-CM

## 2024-02-14 DIAGNOSIS — I10 ESSENTIAL HYPERTENSION: ICD-10-CM

## 2024-02-14 LAB
CARTRIDGE LOT#: ABNORMAL NUMERIC
HEMOGLOBIN A1C: 7 % (ref 4.3–5.6)

## 2024-02-14 PROCEDURE — 83036 HEMOGLOBIN GLYCOSYLATED A1C: CPT | Performed by: FAMILY MEDICINE

## 2024-02-14 PROCEDURE — 99214 OFFICE O/P EST MOD 30 MIN: CPT | Performed by: FAMILY MEDICINE

## 2024-02-14 NOTE — PROGRESS NOTES
Natalia Calhoun is a 68 year old female.   Chief Complaint   Patient presents with    Hypertension     6 month follow up     HPI:   Still working part time and walking some. Reports not eating much. Taking metformin once a day. Does not eat much sweets.    Current Outpatient Medications on File Prior to Visit   Medication Sig Dispense Refill    cyclobenzaprine 5 MG Oral Tab Take 1 tablet (5 mg total) by mouth nightly. 90 tablet 0    omeprazole 20 MG Oral Capsule Delayed Release Take 1 capsule (20 mg total) by mouth every morning before breakfast. 90 capsule 4    furosemide (LASIX) 20 MG Oral Tab Take 1 tablet (20 mg total) by mouth daily. 90 tablet 2    Potassium Chloride ER 10 MEQ Oral Tab CR Take 2 tablets (20 mEq total) by mouth daily. 180 tablet 4    metFORMIN  MG Oral Tablet 24 Hr Take 1 tablet (500 mg total) by mouth daily with breakfast. 90 tablet 4    losartan 100 MG Oral Tab Take 1 tablet (100 mg total) by mouth daily. 90 tablet 3    rosuvastatin 5 MG Oral Tab Take 1 tablet (5 mg total) by mouth nightly. 100 tablet 3     No current facility-administered medications on file prior to visit.      Past Medical History:   Diagnosis Date    High blood pressure     Ovarian cyst     Pneumonia due to organism     Unspecified essential hypertension     Visual impairment     wears prescription glasses      Social History:  Social History     Socioeconomic History    Marital status: Single   Tobacco Use    Smoking status: Former     Types: Cigarettes     Quit date: 2012     Years since quittin.1     Passive exposure: Never    Smokeless tobacco: Never   Vaping Use    Vaping Use: Never used   Substance and Sexual Activity    Alcohol use: Yes     Alcohol/week: 0.0 standard drinks of alcohol     Comment: rarely    Drug use: No   Other Topics Concern    Caffeine Concern Yes     Comment: Coffee, 1 cup daily        REVIEW OF SYSTEMS:   GENERAL HEALTH: feels well otherwise  SKIN: denies any unusual skin lesions  or rashes  HEENT: denies eye complaints,denies sore throat, denies ear pain  RESPIRATORY: denies shortness of breath, denies cough  CARDIOVASCULAR: denies chest pain  GI: denies abdominal pain and denies heartburn  NEURO: denies headaches      EXAM:   /84   Pulse 96   Wt 264 lb 9.6 oz (120 kg)   BMI 41.44 kg/m²   GENERAL: well developed, well nourished,in no apparent distress  SKIN: no rashes,no suspicious lesions  LUNGS: clear to auscultation  CARDIO: RRR without murmur      ASSESSMENT AND PLAN:   1. Hyperlipidemia, unspecified hyperlipidemia type      2. Essential hypertension  BP controlled.     3. Controlled type 2 diabetes mellitus without complication, without long-term current use of insulin (HCC)  Worsening A1C - referral for education and monitor.   - Blood Glucose Monitoring Suppl Does not apply Kit; Glucose meter - covered by insurance. 100 lancets and strips with 5 refills. Check glucose daily  Dispense: 1 kit; Refill: 0  - POC HemoCue Glucose 201 (Finger stick glucose)  - POC Glycohemoglobin [96830]  - CBC With Differential With Platelet; Future  - Comp Metabolic Panel (14); Future  - Lipid Panel; Future  - Hemoglobin A1C; Future  - Microalb/Creat Ratio, Random Urine; Future  - TSH W Reflex To Free T4; Future    4. Vitamin D deficiency    - Vitamin D; Future  - Vitamin B12 [E]; Future          The patient indicates understanding of these issues and agrees to the plan.      Abida Alvarenga MD  2/14/2024  9:20 AM

## 2024-03-06 ENCOUNTER — HOSPITAL ENCOUNTER (OUTPATIENT)
Dept: ENDOCRINOLOGY | Age: 69
Discharge: HOME | End: 2024-03-06
Attending: FAMILY MEDICINE
Payer: MEDICARE

## 2024-03-06 DIAGNOSIS — E11.65 TYPE 2 DIABETES MELLITUS WITH HYPERGLYCEMIA, WITHOUT LONG-TERM CURRENT USE OF INSULIN (HCC): Primary | ICD-10-CM

## 2024-03-06 NOTE — PROGRESS NOTES
Natalia Calhoun : 1955  attended individual initial assessment for Diabetes Education:    Date: 3/6/2024         Start time: 3:30 pm End time: 4:30 pm    HEMOGLOBIN A1C (%)   Date Value   2024 7.0 (A)       Wt Readings from Last 1 Encounters:   24 264 lb 9.6 oz       Assessment: 69 y/o female presents for DSMT education. She is motivated to lower her AIC and gain better glucose control     Diet Hx: B: 2 cups of coffee with oatmeal   L: 10 cherries  D: salad with radishes and other vegetables  May eat some protein   Pt has adequate amount of H2o intake    Education provided on the below topics:     Diabetes Overview:  Pathophysiology, A1C results and treatment options for diabetes self-management reviewed.   Described basic process and treatment options for diabetes self-management.  Introduced long term impact of uncontrolled blood glucose on health.    Healthy Eating:  Obtained usual diet history.  Instructed on Basic Diet Guidelines which includes eating 3 meals/day. Eat moderate amounts at consistent times from day to day. Limit/avoid sweets. Instructed on Balanced Plate Method of meal planning. Instructed to limit grains or starchy vegetables to ¼ of plate, protein to ¼ of plate, non-starchy vegetables to ½ plate and modest portions of fruit, yogurt, milk as desired.    Being Active:   Encouraged Physical Activity and briefly reviewed ADA recommended guidelines for activity with type 2 diabetes.    Taking Medications:   Reviewed current diabetes medications (if applicable).    Monitoring:  Instructed/reinforced blood glucose monitoring, testing schedules and target goals:   Fasting / Pre-meal  mg/dL 2 Hour Post-prandial <180 mg/dL  Demonstrated ability to perform blood glucose testing.     Problem Solving:   Prevention, detection and treatment of acute complications: taught symptoms of hypoglycemia, hyperglycemia, how to treat low blood sugar (Rule of 15) and actions for lowering high  blood glucose levels.     Behavior Change:  Initiated individualized goal setting process and will continue to refine throughout class series.    Recommendations:      Monitor blood glucose as directed.  Follow Balance Plate method of meal planning.   Attend all Group Class in series       Prescriptions sent to preferred pharmacy for blood glucose meter, test strips and lancets per protocol.  Written materials provided for all areas covered.  Patient verbalized understanding and all questions answered.    Rafael Amaro RN,MSN, [facility]

## 2024-03-07 NOTE — TELEPHONE ENCOUNTER
Please review; protocol failed/ has no protocol    Requested Prescriptions   Pending Prescriptions Disp Refills    CYCLOBENZAPRINE 5 MG Oral Tab [Pharmacy Med Name: Cyclobenzaprine Hydrochloride 5 Mg Tab Unic] 90 tablet 0     Sig: Take 1 tablet (5 mg total) by mouth nightly.       There is no refill protocol information for this order        Recent Outpatient Visits              3 weeks ago Hyperlipidemia, unspecified hyperlipidemia type    [facility] Abida Alvarenga MD    Office Visit    2 months ago Cough, unspecified type    [facility] Felix Sanchez APRN    Office Visit    3 months ago [facility]icose veins of both legs with [facility]    [facility]    6 months ago [facility]    [facility]    6 months ago     [facility]    Nurse Only          Future Appointments         Provider Department Appt Notes    In 3 weeks Rafael Amaro RN [facility] in [place]     In 4 weeks Rafael Amaro RN [facility] in [place]     In 5 months Abida Alvarenga MD [facility], [address], [place] Medicare Physical

## 2024-03-08 ENCOUNTER — TELEPHONE (OUTPATIENT)
Dept: ENDOCRINOLOGY | Facility: HOSPITAL | Age: 69
End: 2024-03-08

## 2024-03-08 RX ORDER — CYCLOBENZAPRINE HCL 5 MG
5 TABLET ORAL NIGHTLY
Qty: 90 TABLET | Refills: 0 | Status: SHIPPED | OUTPATIENT
Start: 2024-03-08

## 2024-03-29 ENCOUNTER — HOSPITAL ENCOUNTER (OUTPATIENT)
Dept: ENDOCRINOLOGY | Age: 69
Discharge: HOME | End: 2024-03-29
Attending: FAMILY MEDICINE
Payer: MEDICARE

## 2024-03-29 DIAGNOSIS — E11.65 TYPE 2 DIABETES MELLITUS WITH HYPERGLYCEMIA, WITHOUT LONG-TERM CURRENT USE OF INSULIN (HCC): Primary | ICD-10-CM

## 2024-04-02 RX ORDER — LOSARTAN POTASSIUM 100 MG/1
100 TABLET ORAL DAILY
Qty: 90 TABLET | Refills: 3 | Status: SHIPPED | OUTPATIENT
Start: 2024-04-02

## 2024-04-03 NOTE — TELEPHONE ENCOUNTER
Refill passed per [facility] protocol.    Requested Prescriptions   Pending Prescriptions Disp Refills    LOSARTAN 100 MG Oral Tab [Pharmacy Med Name: Losartan Potassium 100 Mg Tab [facility]] 90 tablet 0     Sig: TAKE ONE TABLET BY MOUTH ONE TIME DAILY       Hypertension Medications Protocol Passed - 3/31/2024  1:31 AM        Passed - CMP or BMP in past 12 months        Passed - Last BP reading less than 140/90     BP Readings from Last 1 Encounters:   02/14/24 135/84               Passed - In person appointment or virtual visit in the past 12 mos or appointment in next 3 mos     Recent Outpatient Visits              1 month ago Hyperlipidemia, unspecified hyperlipidemia type    [facility] Abida Alvarenga MD    Office Visit    3 months ago Cough, unspecified type    [facility] Felix Sanchez APRN    Office Visit    4 months ago Varicose veins of both legs with edema    [facility] Isela Mon DPM    Office Visit    6 months ago [facility]lion cyst of left foot    [facility]    [facility] Visit    7 months ago     [facility]    Nurse Only          Future Appointments         Provider Department Appt Notes    In 3 days Rafael Amaro RN [facility] in [place]     In 4 months Abida Alvarenga MD [facility], [address], [place] Medicare Physical               Passed - EGFRCR or GFRNAA > 50     GFR Evaluation  EGFRCR: 85 , resulted on 8/9/2023

## 2024-04-03 NOTE — PROGRESS NOTES
Natalia Calhoun  DOB11/30/1955 attended [facility] 2[facility]3 Group Diabetes Education Class: Video visit   Natalia Calhoun verbally consents to a telemedicine service with live, interactive video and audio on 4/3/2024.  Patient understands and accepts financial responsibility for any deductible, co-insurance and/or co-pays associated with this service.      Date: 4/3/2024  Start time: 0900 End time: 9:30 am    Wt:   Wt Readings from Last 1 Encounters:   02/14/24 264 lb 9.6 oz     Weight change since start of program: same    Blood Glucose: Not controlled: Progressing toward goal     Healthy Eating:  Reviewed Basic Diet Guidelines as foundation of diabetes meal planning. Reinforced the balanced plate method. Taught nutrition basics defining carbohydrate, protein, and fat. Taught label reading, including an option to count carbohydrate grams or servings. Provided patient with goals for carbohydrate grams/choices for meals and snacks. Discussed sugar substitutes, ETOH and effect on blood glucose. Sudarshan's helpful for smart phones, as well as websites for examining carbohydrate levels provided. Reviewed and reinforced macronutrient's, carbohydrate counting and meal planning.    Problem Solving:  Reinforced signs, symptoms, and treatment of hypoglycemia using the Rule of 15.  Discussed impact of different food items and portion sizes on blood glucose levels.  Discussed blood glucose target of 180 mg/dL, if testing 2 hours post meal.    Monitoring:  Reviewed blood glucose records and importance of tracking foods/blood glucose levels.  Reinforced the importance of taking medications as prescribed.     Behavior Change:  Reviewed and updated individual goals and action plan set by patient.   Addressed barriers to tracking foods/blood glucose levels and following guidelines presented/achieving goals, as a group discussion.    Recommendations:  Follow individual meal plan recommended by Educator (KENNY).  Continue implementing individual  goals.   Attend remaining sessions.  Begin implementing carbohydrate counting and continue dietary and blood glucose tracking.    Written materials provided for all areas covered.    Patient verbalized understanding and has no further questions currently     Rafael Amaro RN,MSN, [facility]

## 2024-04-05 ENCOUNTER — HOSPITAL ENCOUNTER (OUTPATIENT)
Dept: ENDOCRINOLOGY | Age: 69
Discharge: HOME | End: 2024-04-05
Attending: FAMILY MEDICINE
Payer: MEDICARE

## 2024-04-05 DIAGNOSIS — E11.65 TYPE 2 DIABETES MELLITUS WITH HYPERGLYCEMIA, WITHOUT LONG-TERM CURRENT USE OF INSULIN (HCC): Primary | ICD-10-CM

## 2024-04-05 NOTE — PROGRESS NOTES
Natalia Calhoun  DOB11/30/1955 attended Step 4/5 Group Diabetes Education Class: 1:1    Date: 4/5/2024  Start time: 1030 End time: 1130    Wt:   Wt Readings from Last 1 Encounters:   02/14/24 264 lb 9.6 oz     Weight change since start of program: same    Blood Glucose: Not controlled: Progressing toward goal    Reviewed information covered in previous classes including benefits of maintaining good blood glucose control and healthy weight in decreasing diabetes complications.     Prevention, detection, and treatment of chronic complications  Reviewed how to reduce risks of complications including eye, foot, dental, renal, and cardiovascular. Discussed American Diabetes Association guidelines for testing including eye exam, lipid panels, urine protein tests, dental and foot exams. Encouraged to get annual flu shot. Discussed importance of immunizations, vaccinations, and guidelines for sick day management. Discussed wearing medical ID.     Problem Solving  Discussed signs, symptoms, and prevention of DKA and HHNS.  Discussed disaster preparedness and Diabetes.   Discussed Diabetes medication assistance and supply management.     Healthy Eating  Reviewed and reinforced macronutrients, carbohydrate counting, and meal planning.  Reviewed meal planning methods.   Discussed healthy eating approaches for dining out, holidays and special occasions.  Provided tips on how family members can support healthy changes in diet.      Behavior Change  Reviewed and updated individual goals and action plan set by patient.     Recommendations:  Monitor blood glucose as directed.  Follow individual meal plan and continue dietary tracking.  Attend remaining sessions.  Continue to implement individual goals.      Written materials provided for all areas covered.    Patient verbalized understanding and has no further questions at this time.     Rafael Amaro, RN,MSN,[facility]

## 2024-04-13 RX ORDER — FUROSEMIDE 20 MG/1
20 TABLET ORAL DAILY
Qty: 90 TABLET | Refills: 0 | OUTPATIENT
Start: 2024-04-13

## 2024-04-13 NOTE — TELEPHONE ENCOUNTER
Duplicate request, previously addressed.      Too soon     Disp Refills Start End    furosemide (LASIX) 20 MG Oral Tab 90 tablet 2 8/9/2023 --    Sig - Route: Take 1 tablet (20 mg total) by mouth daily. - Oral    Sent to pharmacy as: Furosemide 20 MG Oral Tablet (Lasix)    E-Prescribing Status: Receipt confirmed by pharmacy (8/9/2023  1:34 PM CDT)      Pharmacy    [id number] - [place], [place] - [facility] [address]  [phone], [phone]

## 2024-04-15 RX ORDER — FUROSEMIDE 20 MG/1
20 TABLET ORAL DAILY
Qty: 90 TABLET | Refills: 3 | Status: SHIPPED | OUTPATIENT
Start: 2024-04-15

## 2024-04-15 NOTE — TELEPHONE ENCOUNTER
Refill passed per [facility] protocol.  Requested Prescriptions   Pending Prescriptions Disp Refills    furosemide (LASIX) 20 MG Oral Tab 90 tablet 2     Sig: Take 1 tablet (20 mg total) by mouth daily.       Hypertension Medications Protocol Passed - 4/13/2024 12:58 PM        Passed - CMP or BMP in past 12 months        Passed - Last BP reading less than 140/90     BP Readings from Last 1 Encounters:   02/14/24 135/84               Passed - In person appointment or virtual visit in the past 12 mos or appointment in next 3 mos     Recent Outpatient Visits              2 months ago [facility]lipidemia, unspecified [facility] type    [facility]    [facility]    4 months ago [facility], unspecified [facility]    [facility] Visit    4 months ago Varicose veins of both legs with edema    [facility]    Office Visit    7 months ago Ganglion cyst of left foot    [facility]    Office Visit    7 months ago     [facility]    Nurse Only          Future Appointments         Provider Department Appt Notes    In 4 months Abida Alvarenga MD [facility], [address], [place] Medicare Physical                    Passed - EGFRCR or GFRNAA > 50     GFR Evaluation  EGFRCR: 85 , resulted on 8/9/2023             Recent Outpatient Visits              2 months ago Hyperlipidemia, unspecified hyperlipidemia type    [facility]    Office Visit    4 months ago Cough, unspecified type    [facility]    Office Visit    4 months ago Varicose veins of both legs with edema    [facility] [place] Ment[facility], Lillian, DPM    Office Visit    7 months ago Ganglion cyst of left foot    [facility] [place] [facility]    Office Visit    7 months ago     [facility], [address], [place]    Nurse Only          Future Appointments         Provider Department Appt Notes    In 4 months Abida Alvarenga MD [facility], [address], [place] Medicare Physical

## 2024-04-22 ENCOUNTER — TELEPHONE (OUTPATIENT)
Dept: FAMILY MEDICINE CLINIC | Facility: CLINIC | Age: 69
End: 2024-04-22

## 2024-04-22 RX ORDER — ORAL SEMAGLUTIDE 7 MG/1
7 TABLET ORAL DAILY
Qty: 30 TABLET | Refills: 0 | Status: SHIPPED | OUTPATIENT
Start: 2024-04-22 | End: 2024-05-22

## 2024-04-22 RX ORDER — ORAL SEMAGLUTIDE 14 MG/1
14 TABLET ORAL DAILY
Qty: 90 TABLET | Refills: 4 | Status: SHIPPED | OUTPATIENT
Start: 2024-05-21

## 2024-04-22 RX ORDER — ORAL SEMAGLUTIDE 3 MG/1
3 TABLET ORAL DAILY
Qty: 90 TABLET | Refills: 0 | Status: CANCELLED | OUTPATIENT
Start: 2024-04-22

## 2024-04-22 NOTE — TELEPHONE ENCOUNTER
Patient stated that the diabetes specialist Rafael Amaro gave her a sample of rybelsus 3mg daily. Patient has been taking the medication and thinks it is helping. Patient check her blood sugar once weekly. On 4/14/2024 morning blood sugar was 87. Wanted to know if Dr Alvarenga can continue refilling the rybelsus 3mg daily? Please advise. Rx pended.

## 2024-04-22 NOTE — TELEPHONE ENCOUNTER
Spoke to patient and relayed Dr. Alvarenga's message below. Patient verbalized understanding and had no further questions.

## 2024-04-22 NOTE — TELEPHONE ENCOUNTER
Yes but For it to work properly, supposed to continue to go up. Now 7 mg daily for  month then 14 mg

## 2024-05-01 RX ORDER — ROSUVASTATIN CALCIUM 5 MG/1
5 TABLET, COATED ORAL NIGHTLY
Qty: 90 TABLET | Refills: 3 | Status: SHIPPED | OUTPATIENT
Start: 2024-05-01

## 2024-05-01 NOTE — TELEPHONE ENCOUNTER
Refill passed per [facility] protocol.  Requested Prescriptions   Pending Prescriptions Disp Refills    ROSUVASTATIN 5 MG Oral Tab [Pharmacy Med Name: Rosuvastatin Calcium 5 Mg Tab Nort] 100 tablet 0     Sig: Take 1 tablet (5 mg total) by mouth nightly.       Cholesterol Medication Protocol Passed - 4/30/2024  1:30 AM        Passed - ALT < 80     Lab Results   Component Value Date    ALT 36 08/09/2023             Passed - ALT resulted within past year        Passed - Lipid panel within past 12 months     Lab Results   Component Value Date    CHOLEST 162 08/09/2023    TRIG 157 (H) 08/09/2023    HDL 53 08/09/2023    LDL 82 08/09/2023    VLDL 25 08/09/2023    NONHDLC 109 08/09/2023             Passed - In person appointment or virtual visit in the past 12 mos or appointment in next 3 mos     Recent Outpatient Visits              2 months ago Hyperlipidemia, unspecified hyperlipidemia type    [facility] Abida Alvarenga MD    Office Visit    4 months ago Cough, unspecified type    [facility] Felix Sanchez APRN    Office Visit    5 months ago Varicose veins of both legs with edema    [facility]    Office Visit    7 months ago Ganglion cyst of left foot    [facility]    Office Visit    8 months ago     [facility]    Nurse Only          Future Appointments         Provider Department Appt Notes    In 3 months Abida Alvarenga MD [facility], [address], [place] Medicare Physical                       Recent Outpatient Visits              2 months ago Hyperlipidemia, unspecified hyperlipidemia type    [facility]    Office Visit    4 months ago Cough, unspecified type     [facility] Felix Sanchez APRN    Office Visit    5 months ago Varicose veins of both legs with edema    [facility], Isela Chris DPM    Office Visit    7 months ago Ganglion cyst of left foot    [facility]    Office Visit    8 months ago     [facility] [place]    Nurse Only          Future Appointments         Provider Department Appt Notes    In 3 months Abida Alvarenga MD [facility] [place] Medicare Physical

## 2024-06-17 RX ORDER — CYCLOBENZAPRINE HCL 5 MG
5 TABLET ORAL NIGHTLY
Qty: 90 TABLET | Refills: 0 | Status: SHIPPED | OUTPATIENT
Start: 2024-06-17

## 2024-06-17 NOTE — TELEPHONE ENCOUNTER
Please review. Protocol Failed; No Protocol    Requested Prescriptions   Pending Prescriptions Disp Refills    CYCLOBENZAPRINE 5 MG Oral Tab [Pharmacy Med Name: Cyclobenzaprine Hydrochloride 5 Mg Tab Unic] 90 tablet 0     Sig: TAKE ONE TABLET BY MOUTH ONCE DAILY AT NIGHT       There is no refill protocol information for this order            Future Appointments         Provider Department Appt Notes    In 1 month Abida Alvarenga MD [facility], [address], [place] Medicare Physical          Recent Outpatient Visits              4 months ago Hyperlipidemia, unspecified hyperlipidemia type    [facility]    Office Visit    6 months ago Cough, unspecified type    [facility] Felix Sanchez APRN    Office Visit    6 months ago Varicose veins of both legs with edema    [facility]    Office Visit    9 months ago Ganglion cyst of left foot    [facility]    Office Visit    9 months ago     [facility]    Nurse Only

## 2024-06-27 ENCOUNTER — LAB ENCOUNTER (OUTPATIENT)
Dept: LAB | Age: 69
End: 2024-06-27
Attending: FAMILY MEDICINE
Payer: MEDICARE

## 2024-06-27 DIAGNOSIS — E55.9 VITAMIN D DEFICIENCY: ICD-10-CM

## 2024-06-27 DIAGNOSIS — E11.9 CONTROLLED TYPE 2 DIABETES MELLITUS WITHOUT COMPLICATION, WITHOUT LONG-TERM CURRENT USE OF INSULIN (HCC): ICD-10-CM

## 2024-06-27 LAB
ALBUMIN SERPL-MCNC: 4.4 G/DL (ref 3.2–4.8)
ALBUMIN/GLOB SERPL: 1.6 {RATIO} (ref 1–2)
ALP LIVER SERPL-CCNC: 100 U/L
ALT SERPL-CCNC: 18 U/L
ANION GAP SERPL CALC-SCNC: 6 MMOL/L (ref 0–18)
AST SERPL-CCNC: 17 U/L (ref ?–34)
BASOPHILS # BLD AUTO: 0.08 X10(3) UL (ref 0–0.2)
BASOPHILS NFR BLD AUTO: 1.1 %
BILIRUB SERPL-MCNC: 1 MG/DL (ref 0.2–1.1)
BUN BLD-MCNC: 15 MG/DL (ref 9–23)
BUN/CREAT SERPL: 16.3 (ref 10–20)
CALCIUM BLD-MCNC: 9.8 MG/DL (ref 8.7–10.4)
CHLORIDE SERPL-SCNC: 110 MMOL/L (ref 98–112)
CHOLEST SERPL-MCNC: 140 MG/DL (ref ?–200)
CO2 SERPL-SCNC: 27 MMOL/L (ref 21–32)
CREAT BLD-MCNC: 0.92 MG/DL
CREAT UR-SCNC: 84.6 MG/DL
DEPRECATED RDW RBC AUTO: 44.2 FL (ref 35.1–46.3)
EGFRCR SERPLBLD CKD-EPI 2021: 68 ML/MIN/1.73M2 (ref 60–?)
EOSINOPHIL # BLD AUTO: 0.19 X10(3) UL (ref 0–0.7)
EOSINOPHIL NFR BLD AUTO: 2.7 %
ERYTHROCYTE [DISTWIDTH] IN BLOOD BY AUTOMATED COUNT: 14 % (ref 11–15)
EST. AVERAGE GLUCOSE BLD GHB EST-MCNC: 120 MG/DL (ref 68–126)
FASTING PATIENT LIPID ANSWER: YES
FASTING STATUS PATIENT QL REPORTED: YES
GLOBULIN PLAS-MCNC: 2.8 G/DL (ref 2–3.5)
GLUCOSE BLD-MCNC: 95 MG/DL (ref 70–99)
HBA1C MFR BLD: 5.8 % (ref ?–5.7)
HCT VFR BLD AUTO: 44.1 %
HDLC SERPL-MCNC: 47 MG/DL (ref 40–59)
HGB BLD-MCNC: 13.9 G/DL
IMM GRANULOCYTES # BLD AUTO: 0.02 X10(3) UL (ref 0–1)
IMM GRANULOCYTES NFR BLD: 0.3 %
LDLC SERPL CALC-MCNC: 71 MG/DL (ref ?–100)
LYMPHOCYTES # BLD AUTO: 2.27 X10(3) UL (ref 1–4)
LYMPHOCYTES NFR BLD AUTO: 31.7 %
MCH RBC QN AUTO: 27.1 PG (ref 26–34)
MCHC RBC AUTO-ENTMCNC: 31.5 G/DL (ref 31–37)
MCV RBC AUTO: 86 FL
MICROALBUMIN UR-MCNC: <0.3 MG/DL
MONOCYTES # BLD AUTO: 0.55 X10(3) UL (ref 0.1–1)
MONOCYTES NFR BLD AUTO: 7.7 %
NEUTROPHILS # BLD AUTO: 4.04 X10 (3) UL (ref 1.5–7.7)
NEUTROPHILS # BLD AUTO: 4.04 X10(3) UL (ref 1.5–7.7)
NEUTROPHILS NFR BLD AUTO: 56.5 %
NONHDLC SERPL-MCNC: 93 MG/DL (ref ?–130)
OSMOLALITY SERPL CALC.SUM OF ELEC: 297 MOSM/KG (ref 275–295)
PLATELET # BLD AUTO: 250 10(3)UL (ref 150–450)
POTASSIUM SERPL-SCNC: 4.7 MMOL/L (ref 3.5–5.1)
PROT SERPL-MCNC: 7.2 G/DL (ref 5.7–8.2)
RBC # BLD AUTO: 5.13 X10(6)UL
SODIUM SERPL-SCNC: 143 MMOL/L (ref 136–145)
TRIGL SERPL-MCNC: 123 MG/DL (ref 30–149)
TSI SER-ACNC: 1.12 MIU/ML (ref 0.55–4.78)
VIT B12 SERPL-MCNC: 960 PG/ML (ref 211–911)
VIT D+METAB SERPL-MCNC: 36.3 NG/ML (ref 30–100)
VLDLC SERPL CALC-MCNC: 19 MG/DL (ref 0–30)
WBC # BLD AUTO: 7.2 X10(3) UL (ref 4–11)

## 2024-06-27 PROCEDURE — 82306 VITAMIN D 25 HYDROXY: CPT

## 2024-06-27 PROCEDURE — 80061 LIPID PANEL: CPT

## 2024-06-27 PROCEDURE — 36415 COLL VENOUS BLD VENIPUNCTURE: CPT

## 2024-06-27 PROCEDURE — 84443 ASSAY THYROID STIM HORMONE: CPT

## 2024-06-27 PROCEDURE — 82570 ASSAY OF URINE CREATININE: CPT

## 2024-06-27 PROCEDURE — 85025 COMPLETE CBC W/AUTO DIFF WBC: CPT

## 2024-06-27 PROCEDURE — 82043 UR ALBUMIN QUANTITATIVE: CPT

## 2024-06-27 PROCEDURE — 80053 COMPREHEN METABOLIC PANEL: CPT

## 2024-06-27 PROCEDURE — 82607 VITAMIN B-12: CPT

## 2024-06-27 PROCEDURE — 83036 HEMOGLOBIN GLYCOSYLATED A1C: CPT

## 2024-08-14 ENCOUNTER — OFFICE VISIT (OUTPATIENT)
Dept: FAMILY MEDICINE CLINIC | Facility: CLINIC | Age: 69
End: 2024-08-14
Payer: MEDICARE

## 2024-08-14 ENCOUNTER — TELEPHONE (OUTPATIENT)
Dept: FAMILY MEDICINE CLINIC | Facility: CLINIC | Age: 69
End: 2024-08-14

## 2024-08-14 VITALS
HEART RATE: 92 BPM | BODY MASS INDEX: 37.38 KG/M2 | SYSTOLIC BLOOD PRESSURE: 123 MMHG | WEIGHT: 238.19 LBS | DIASTOLIC BLOOD PRESSURE: 82 MMHG | HEIGHT: 67 IN

## 2024-08-14 DIAGNOSIS — I10 ESSENTIAL HYPERTENSION: ICD-10-CM

## 2024-08-14 DIAGNOSIS — E11.9 CONTROLLED TYPE 2 DIABETES MELLITUS WITHOUT COMPLICATION, WITHOUT LONG-TERM CURRENT USE OF INSULIN (HCC): Primary | ICD-10-CM

## 2024-08-14 DIAGNOSIS — E66.01 OBESITY, MORBID (HCC): ICD-10-CM

## 2024-08-14 DIAGNOSIS — E78.5 HYPERLIPIDEMIA, UNSPECIFIED HYPERLIPIDEMIA TYPE: ICD-10-CM

## 2024-08-14 DIAGNOSIS — Z00.00 ENCOUNTER FOR ANNUAL HEALTH EXAMINATION: ICD-10-CM

## 2024-08-14 DIAGNOSIS — Z12.31 SCREENING MAMMOGRAM FOR BREAST CANCER: ICD-10-CM

## 2024-08-14 PROBLEM — H92.09 EARACHE: Status: RESOLVED | Noted: 2023-12-14 | Resolved: 2024-08-14

## 2024-08-14 PROBLEM — J34.89 RHINORRHEA: Status: RESOLVED | Noted: 2023-12-14 | Resolved: 2024-08-14

## 2024-08-14 PROBLEM — H66.92 ACUTE LEFT OTITIS MEDIA: Status: RESOLVED | Noted: 2023-12-14 | Resolved: 2024-08-14

## 2024-08-14 PROBLEM — R73.03 PREDIABETES: Status: RESOLVED | Noted: 2022-04-28 | Resolved: 2024-08-14

## 2024-08-14 PROBLEM — R05.9 COUGH: Status: RESOLVED | Noted: 2023-12-14 | Resolved: 2024-08-14

## 2024-08-14 PROCEDURE — 96160 PT-FOCUSED HLTH RISK ASSMT: CPT | Performed by: FAMILY MEDICINE

## 2024-08-14 PROCEDURE — 3074F SYST BP LT 130 MM HG: CPT | Performed by: FAMILY MEDICINE

## 2024-08-14 PROCEDURE — G0439 PPPS, SUBSEQ VISIT: HCPCS | Performed by: FAMILY MEDICINE

## 2024-08-14 PROCEDURE — 3008F BODY MASS INDEX DOCD: CPT | Performed by: FAMILY MEDICINE

## 2024-08-14 PROCEDURE — 3078F DIAST BP <80 MM HG: CPT | Performed by: FAMILY MEDICINE

## 2024-08-14 NOTE — PROGRESS NOTES
Subjective:   Natalia Calhoun is a 68 year old female who presents for a MA AHA (Medicare Advantage Annual Health Assessment) and Medicare Subsequent Annual Wellness visit (Pt already had Initial Annual Wellness) and scheduled follow up of multiple significant but stable problems.   Doing great with weight loss. Loss almost 30 lbs - cut down on carbs and rybelsus has helped.   Still working at [employer]   History/Other:   Fall Risk Assessment:   She has been screened for Falls and is low risk.      Cognitive Assessment:   She had a completely normal cognitive assessment - see flowsheet entries     Functional Ability/Status:   Natalia Calhoun has a completely normal functional assessment. See flowsheet for details.      Depression Screening (PHQ):  PHQ-2 SCORE: 0  , done 8/7/2024          Advanced Directives:   She does have a Living Will but we do NOT have it on file in Epic.    She does have a POA but we do NOT have it on file in Epic.    Discussed Advance Care Planning with patient (and family/surrogate if present). Standard forms made available to patient in After Visit Summary.      Patient Active Problem List   Diagnosis    Essential hypertension    Obesity, morbid (HCC)    Hyperlipidemia    Controlled type 2 diabetes mellitus without complication, without long-term current use of insulin ([facility])     Allergies:  She has No Known Allergies.    Current Medications:  Outpatient Medications Marked as Taking for the 8/14/24 encounter (Office Visit) with Abida Alvarenga MD   Medication Sig    cyclobenzaprine 5 MG Oral Tab Take 1 tablet (5 mg total) by mouth nightly.    rosuvastatin 5 MG Oral Tab Take 1 tablet (5 mg total) by mouth nightly.    Semaglutide (RYBELSUS) 14 MG Oral Tab Take 14 mg by mouth daily.    furosemide (LASIX) 20 MG Oral Tab Take 1 tablet (20 mg total) by mouth daily.    losartan 100 MG Oral Tab TAKE ONE TABLET BY MOUTH ONE TIME DAILY    Blood Glucose Monitoring Suppl (ONETOUCH VERIO FLEX SYSTEM)  w/Device Does not apply Kit 1 kit 2 (two) times daily. May substitute per insurance formulary    omeprazole 20 MG Oral Capsule Delayed Release Take 1 capsule (20 mg total) by mouth every morning before breakfast.    Potassium Chloride ER 10 MEQ Oral Tab CR Take 2 tablets (20 mEq total) by mouth daily.       Medical History:  She  has a past medical history of High blood pressure, Ovarian cyst, Pneumonia due to organism, Unspecified essential hypertension, and Visual impairment.  Surgical History:  She  has a past surgical history that includes  and removal of ovarian cyst(s).   Family History:  Her family history includes Heart Disease in her father; Heart Disorder in her mother; Kidney Disease in her mother; Stroke in her mother.  Social History:  She  reports that she quit smoking about 12 years ago. Her smoking use included cigarettes. She has never been exposed to tobacco smoke. She has never used smokeless tobacco. She reports current alcohol use. She reports that she does not use drugs.    Tobacco:  She smoked tobacco in the past but quit greater than 12 months ago.  Social History     Tobacco Use   Smoking Status Former    Current packs/day: 0.00    Types: Cigarettes    Quit date: 2012    Years since quittin.6    Passive exposure: Never   Smokeless Tobacco Never        CAGE Alcohol Screen:   CAGE screening score of 0 on 2024, showing low risk of alcohol abuse.      Patient Care Team:  Abida Alvarenga MD as PCP - General    Review of Systems     Negative except foot pain.     Objective:   Physical Exam  Constitutional:       Appearance: Normal appearance. She is well-developed.   HENT:      Right Ear: Tympanic membrane normal.      Left Ear: Tympanic membrane normal.   Eyes:      Conjunctiva/sclera: Conjunctivae normal.   Cardiovascular:      Rate and Rhythm: Normal rate and regular rhythm.      Heart sounds: Normal heart sounds.   Pulmonary:      Effort: Pulmonary effort is normal.       Breath sounds: Normal breath sounds.   Abdominal:      General: Bowel sounds are normal.      Palpations: Abdomen is soft.   Skin:     General: Skin is warm and dry.   Neurological:      Mental Status: She is alert and oriented to person, place, and time.      Deep Tendon Reflexes: Reflexes are normal and symmetric.   Psychiatric:         Mood and Affect: Mood normal.         Behavior: Behavior normal.         Bilateral barefoot skin diabetic exam is normal, visualized feet and the appearance is normal.  Bilateral monofilament/sensation of both feet is normal.  Pulsation pedal pulse exam of both lower legs/feet is normal as well.     /82   Pulse 92   Ht 5' 7\" (1.702 m)   Wt 238 lb 3.2 oz (108 kg)   BMI 37.31 kg/m²  Estimated body mass index is 37.31 kg/m² as calculated from the following:    Height as of this encounter: 5' 7\" (1.702 m).    Weight as of this encounter: 238 lb 3.2 oz (108 kg).    Medicare Hearing Assessment:   Hearing Screening    Time taken: 8/14/2024  8:27 AM  Screening Method: Questionnaire  I have a problem hearing over the telephone: No I have trouble following the conversations when two or more people are talking at the same time: No   I have trouble understanding things on the TV: No I have to strain to understand conversations: No   I have to worry about missing the telephone ring or doorbell: No I have trouble hearing conversations in a noisy background such as a crowded room or restaurant: No   I get confused about where sounds come from: No I misunderstand some words in a sentence and need to ask people to repeat themselves: No   I especially have trouble understanding the speech of women and children: No I have trouble understanding the speaker in a large room such as at a meeting or place of [employer]: No   Many people I talk to seem to mumble (or don't speak clearly): No People get annoyed because I misunderstand what they say: No   I misunderstand what others are saying and  make inappropriate responses: No I avoid social activities because I cannot hear well and fear I will reply improperly: No   Family members and friends have told me they think I may have hearing loss: No             Visual Acuity:   Right Eye Visual Acuity: Corrected Right Eye Chart Acuity: 20/40   Left Eye Visual Acuity: Corrected Left Eye Chart Acuity: 20/40   Both Eyes Visual Acuity: Corrected Both Eyes Chart Acuity: 20/40            Assessment & Plan:   Natalia Calhoun is a 68 year old female who presents for a Medicare Assessment.     1. Controlled type 2 diabetes mellitus without complication, without long-term current use of insulin (HCC)  Doing awesome with rybelsus. Ok to stop metformin. Add weight training   - Hemoglobin A1C; Future  - Comp Metabolic Panel (14); Future  - Lipid Panel; Future    2. Obesity, morbid (HCC)  Contributing to diabetes and hyperlipidemia but losing weight and doing great.     3. Hyperlipidemia, unspecified hyperlipidemia type  Stable on statin     4. Essential hypertension  Stable on meds     5. Screening mammogram for breast cancer    - [facility] ALAN 2D+3D SCREENING BILAT (CPT=77067/41278); Future    6. Encounter for annual health examination    - COLOGUARD COLON CANCER SCREENING (EXTERNAL)    The patient indicates understanding of these issues and agrees to the plan.  Reinforced healthy diet, lifestyle, and exercise.      No follow-ups on file.     Abida Alvarenga MD, 8/14/2024     Supplementary Documentation:   General Health:  In the past six months, have you lost more than 10 pounds without trying?: 2 - No  Has your appetite been poor?: No  Type of Diet: Vegetarian;Low Salt;Low Carb  How does the patient maintain a good energy level?: Daily Walks  How would you describe your daily physical activity?: Moderate  How would you describe your current health state?: Good  How do you maintain positive mental well-being?: Social Interaction;Puzzles;Games;Visiting Friends  On a scale of 0  to 10, with 0 being no pain and 10 being severe pain, what is your pain level?: 5 - (Moderate)  In the past six months, have you experienced urine leakage?: 0-No  At any time do you feel concerned for the safety/well-being of yourself and/or your children, in your home or elsewhere?: No  Have you had any immunizations at another office such as Influenza, Hepatitis B, Tetanus, or Pneumococcal?: Yes    Health Maintenance   Topic Date Due    Diabetes Care Foot Exam  Never done    Diabetes Care Dilated Eye Exam  Never done    Colorectal Cancer Screening  04/23/2023    COVID-19 Vaccine (4 - 2023-24 season) 09/01/2023    Mammogram  10/04/2024    Influenza Vaccine (1) 10/01/2024    Diabetes Care A1C  12/27/2024    Diabetes Care: GFR  06/27/2025    Diabetes Care: Microalb/Creat Ratio  06/27/2025    DEXA Scan  Completed    MA Annual Health Assessment  Completed    Annual Depression Screening  Completed    Fall Risk Screening (Annual)  Completed    Pneumococcal Vaccine: 65+ Years  Completed    Zoster Vaccines  Completed

## 2024-08-14 NOTE — TELEPHONE ENCOUNTER
Sent signed cologuard requisition order form to [email or web address] fax# [phone]. Confirmation rec'd

## 2024-08-26 ENCOUNTER — TELEPHONE (OUTPATIENT)
Dept: FAMILY MEDICINE CLINIC | Facility: CLINIC | Age: 69
End: 2024-08-26

## 2024-08-26 DIAGNOSIS — Z12.11 SCREENING FOR COLON CANCER: Primary | ICD-10-CM

## 2024-08-26 NOTE — TELEPHONE ENCOUNTER
Spoke with Ariel at [employer] Date of Birth verified  She stated she need an updated diagnosis code for Cologuard test as patient insurance is not paying.    They received the diagnosis Z00.00 (general adult exam).   They recommending DX as for colon cancer screening or colorectal screening..   Case # [id number]  pls advise, thanks in advance.

## 2024-08-26 NOTE — TELEPHONE ENCOUNTER
Sent signed cologuard requisition order form to [employer] fax# [phone]. Confirmation rec'd

## 2024-08-29 ENCOUNTER — TELEPHONE (OUTPATIENT)
Dept: FAMILY MEDICINE CLINIC | Facility: CLINIC | Age: 69
End: 2024-08-29

## 2024-08-29 NOTE — TELEPHONE ENCOUNTER
Lucrecia, with Ze in [facility] office.   Patient's date of birth and full name both confirmed.   Insurance needs ICD 10 code for Rybelsus.     Per 8/14/24 Visit, Copied from  visit notes:   \"1. Controlled type 2 diabetes mellitus without complication, without long-term current use of insulin (HCC)  Doing awesome with rybelsus. Ok to stop metformin. Add weight training   - Hemoglobin A1C; Future  - Comp Metabolic Panel (14); Future  - Lipid Panel; Future\"       Chart reviewed for Code, RN provided the following code   Controlled type 2 diabetes mellitus without complication, without long-term current use of insulin (HCC)             Details  Code: E11.9        She verbalizes understanding of all information, and agreeable to plan.   No further questions.

## 2024-09-17 RX ORDER — METFORMIN HCL 500 MG
500 TABLET, EXTENDED RELEASE 24 HR ORAL
Qty: 90 TABLET | Refills: 0 | OUTPATIENT
Start: 2024-09-17

## 2024-09-29 DIAGNOSIS — E11.65 TYPE 2 DIABETES MELLITUS WITH HYPERGLYCEMIA, WITHOUT LONG-TERM CURRENT USE OF INSULIN (HCC): ICD-10-CM

## 2024-10-02 NOTE — TELEPHONE ENCOUNTER
Dear [facility],    I am hoping you can help clarify directions on how often patient is supposed to be testing her blood sugars.    Patient last saw Rafael Jimenez RN Diabetic Educator, but it seems she is no longer in our system.    Her last visit with Rafael 2024    We received refill requests for patient's test strips and lancets and the pharmacy needs more clarification on the directions.    Directions are not clear. The directions show to test twice daily in the morning and 2 hours after each meal.     Is patient to test four times daily or twice daily?    Assuming directions are supposed to be: Test blood sugar twice daily and with a note in the patient sig the instructions for when they should be testing?    Per 3/6/24 visit with KIT [facility]:  Fasting / Pre-meal  mg/dL 2 Hour Post-prandial <180 mg/dL)     I pended both prescriptions with the:  To test blood sugar twice daily as directed by diabetic educator's instructions.    Please change/adjust if not correct or appropriate.    Lancets (ONETOUCH DELICA PLUS [id number]) Does not apply Misc 100 each 3 3/6/2024 2024   Si each 2 (two) times daily. Test morning and 2 hours after each meal.   Route: Does not apply          Glucose Blood (ONETOUCH VERIO) In Vitro Strip 100 strip 3 3/6/2024 2024   Si each by Finger stick route 2 (two) times daily. Please test in the morning and 2 hour after each meal.   Notes to Pharmacy: Please sub preferred meter, strips, lancets per pt's pharmacy plan.   Route: Finger stick     Requested Prescriptions     Pending Prescriptions Disp Refills    Glucose Blood (ONETOUCH VERIO) In Vitro Strip [Pharmacy Med Name: Onetouch Verio Test Strips Susan Life] 200 strip 3     Si strip by In Vitro route 2 (two) times daily. AS DIRECTED BY DIABETIC EDUCATOR'S INSTRUCTIONS    Lancets (ONETOUCH DELICA PLUS [id number]) Does not apply Misc 200 each 3     Si Lancet by Finger stick route 2 (two) times daily. AS  DIRECTED BY DIABETIC EDUCATOR'S INSTRUCTIONS

## 2024-10-04 RX ORDER — LANCETS 30 GAUGE
1 EACH MISCELLANEOUS 2 TIMES DAILY
Qty: 200 EACH | Refills: 3 | Status: SHIPPED | OUTPATIENT
Start: 2024-10-04

## 2024-10-04 RX ORDER — BLOOD SUGAR DIAGNOSTIC
STRIP MISCELLANEOUS
Qty: 200 STRIP | Refills: 3 | Status: SHIPPED | OUTPATIENT
Start: 2024-10-04

## 2024-10-04 NOTE — TELEPHONE ENCOUNTER
Just saw PCP 8/14/24 well controlled DM with A1c 5.8 6/27/24. Refill approved. Covering for MD martinez.

## 2024-10-04 NOTE — TELEPHONE ENCOUNTER
Routing to pod mate/alternate provider due to High Priority status and Dr. Alvarenga is out of office.    Dear Dr. Alvarenga,    Pharmacy needs clarification on specific directions.     **Is patient to test four times daily or twice daily?    The RN that the patient was originally seeing, Rafael Amaro is no longer with [facility] and cannot be found within our system., the rquest was sent to the [facility] Diabetic Educator Clinical Pool    Please see notes below that was sent regarding clarification of testing frequency.     Currently I have the directions as:   USE 1 (ONE) NEW STRIP TWICE DAILY TO TEST BLOOD SUGARS, AS DIRECTED BY DIABETIC EDUCATOR'S INSTRUCTIONS    The initial refill request was sent 5 days ago, and then 2 days ago sent to the diabetic educ pool after that pharmacy sent high prioirty following up.     We have not gotten any response to this request and patient is in need of a refill.    When patient saw diabetic educator, the directions for the test strips and lancets were stated:   twice daily. To test in the morning and after each meal.     I have adjusted the directions for the lancets and test strips to say: 1 each 2 (two) times daily. Test morning and 2 hours after each meal.     The instructions to patient from the 3/6/24 visit stated:   Monitoring:  Instructed/reinforced blood glucose monitoring, testing schedules and target goals:               Fasting / Pre-meal  mg/dL       2 Hour Post-prandial <180 mg/dL  Demonstrated ability to perform blood glucose testing.

## 2024-10-07 ENCOUNTER — HOSPITAL ENCOUNTER (OUTPATIENT)
Dept: MAMMOGRAPHY | Age: 69
Discharge: HOME | End: 2024-10-07
Attending: FAMILY MEDICINE
Payer: MEDICARE

## 2024-10-07 DIAGNOSIS — Z12.31 SCREENING MAMMOGRAM FOR BREAST CANCER: ICD-10-CM

## 2024-10-07 PROCEDURE — 77063 BREAST TOMOSYNTHESIS BI: CPT | Performed by: FAMILY MEDICINE

## 2024-10-07 PROCEDURE — 77067 SCR MAMMO BI INCL CAD: CPT | Performed by: FAMILY MEDICINE

## 2024-10-15 RX ORDER — POTASSIUM CHLORIDE 750 MG/1
20 TABLET, EXTENDED RELEASE ORAL DAILY
Qty: 180 TABLET | Refills: 0 | Status: SHIPPED | OUTPATIENT
Start: 2024-10-15

## 2024-10-15 NOTE — TELEPHONE ENCOUNTER
Please review. Protocol Failed; No Protocol    Requested Prescriptions   Pending Prescriptions Disp Refills    POTASSIUM CHLORIDE ER 10 MEQ Oral Tab CR [Pharmacy Med Name: Potassium Chloride Er 10 Meq Tab Upsh] 180 tablet 0     Sig: TAKE TWO TABLETS BY MOUTH DAILY       There is no refill protocol information for this order              Recent Outpatient Visits              2 months ago Controlled type 2 diabetes mellitus without complication, without long-term current use of insulin (HCC)    [facility]    [facility]    8 months ago Hyperlipidemia, unspecified hyperlipidemia type    [facility]    [facility]    10 months ago Cough, unspecified type    [facility]    Office Visit    10 months ago Varicose veins of both legs with edema    [facility], Isela Chris DPM    Office Visit    1 year ago Ganglion cyst of left foot    [facility]    [facility] Visit

## 2024-11-19 NOTE — TELEPHONE ENCOUNTER
Refill Passed Per Protocol    Requested Prescriptions   Pending Prescriptions Disp Refills    OMEPRAZOLE 20 MG Oral Capsule Delayed Release [Pharmacy Med Name: Omeprazole Dr 20 Mg Cap Nort] 90 capsule 0     Sig: TAKE ONE CAPSULE BY MOUTH EVERY MORNING before breakfast       Gastrointestional Medication Protocol Passed - 11/18/2024  9:08 PM        Passed - In person appointment or virtual visit in the past 12 mos or appointment in next 3 mos     Recent Outpatient Visits              3 months ago Controlled type 2 diabetes mellitus without complication, without long-term current use of insulin ([facility]    [facility]    9 months ago Hyperlipidemia, unspecified hyperlipidemia type    [facility]    [facility]    11 months ago Cough, unspecified type    [facility], Felix Kruger APRN    Office Visit    12 months ago Varicose veins of both legs with edema    [facility]    Office Visit    1 year ago Ganglion cyst of left foot    [facility]    [facility]                             Recent Outpatient Visits              3 months ago Controlled type 2 diabetes mellitus without complication, without long-term current use of insulin ([facility])    [facility]    [facility]    9 months ago Hyperlipidemia, unspecified hyperlipidemia type    [facility]    [facility]    11 months ago Cough, unspecified type    [facility]    Office Visit    12 months ago Varicose veins of both legs with edema    [facility]  [facility], Isela Chris DPM    Office Visit    1 year ago Ganglion cyst of left foot    [facility] [address], Isela Chris DPM    Office Visit

## 2024-12-03 ENCOUNTER — LAB ENCOUNTER (OUTPATIENT)
Dept: LAB | Age: 69
End: 2024-12-03
Attending: FAMILY MEDICINE
Payer: MEDICARE

## 2024-12-03 DIAGNOSIS — E11.9 CONTROLLED TYPE 2 DIABETES MELLITUS WITHOUT COMPLICATION, WITHOUT LONG-TERM CURRENT USE OF INSULIN (HCC): ICD-10-CM

## 2024-12-03 LAB
ALBUMIN SERPL-MCNC: 4.2 G/DL (ref 3.2–4.8)
ALBUMIN/GLOB SERPL: 1.6 {RATIO} (ref 1–2)
ALP LIVER SERPL-CCNC: 86 U/L
ALT SERPL-CCNC: 58 U/L
ANION GAP SERPL CALC-SCNC: 8 MMOL/L (ref 0–18)
AST SERPL-CCNC: 19 U/L (ref ?–34)
BILIRUB SERPL-MCNC: 0.9 MG/DL (ref 0.2–1.1)
BUN BLD-MCNC: 17 MG/DL (ref 9–23)
BUN/CREAT SERPL: 21.8 (ref 10–20)
CALCIUM BLD-MCNC: 9.7 MG/DL (ref 8.7–10.4)
CHLORIDE SERPL-SCNC: 109 MMOL/L (ref 98–112)
CHOLEST SERPL-MCNC: 133 MG/DL (ref ?–200)
CO2 SERPL-SCNC: 27 MMOL/L (ref 21–32)
CREAT BLD-MCNC: 0.78 MG/DL
EGFRCR SERPLBLD CKD-EPI 2021: 83 ML/MIN/1.73M2 (ref 60–?)
EST. AVERAGE GLUCOSE BLD GHB EST-MCNC: 123 MG/DL (ref 68–126)
FASTING PATIENT LIPID ANSWER: YES
FASTING STATUS PATIENT QL REPORTED: YES
GLOBULIN PLAS-MCNC: 2.7 G/DL (ref 2–3.5)
GLUCOSE BLD-MCNC: 93 MG/DL (ref 70–99)
HBA1C MFR BLD: 5.9 % (ref ?–5.7)
HDLC SERPL-MCNC: 46 MG/DL (ref 40–59)
LDLC SERPL CALC-MCNC: 66 MG/DL (ref ?–100)
NONHDLC SERPL-MCNC: 87 MG/DL (ref ?–130)
OSMOLALITY SERPL CALC.SUM OF ELEC: 299 MOSM/KG (ref 275–295)
POTASSIUM SERPL-SCNC: 4 MMOL/L (ref 3.5–5.1)
PROT SERPL-MCNC: 6.9 G/DL (ref 5.7–8.2)
SODIUM SERPL-SCNC: 144 MMOL/L (ref 136–145)
TRIGL SERPL-MCNC: 113 MG/DL (ref 30–149)
VLDLC SERPL CALC-MCNC: 17 MG/DL (ref 0–30)

## 2024-12-03 PROCEDURE — 80061 LIPID PANEL: CPT

## 2024-12-03 PROCEDURE — 36415 COLL VENOUS BLD VENIPUNCTURE: CPT

## 2024-12-03 PROCEDURE — 83036 HEMOGLOBIN GLYCOSYLATED A1C: CPT

## 2024-12-03 PROCEDURE — 80053 COMPREHEN METABOLIC PANEL: CPT

## 2024-12-09 NOTE — PROGRESS NOTES
Labs are stable. Still in pre-diabetes range with current medications. Continue the same. - Dr. Alvarenga

## 2025-01-15 RX ORDER — POTASSIUM CHLORIDE 750 MG/1
20 TABLET, EXTENDED RELEASE ORAL DAILY
Qty: 180 TABLET | Refills: 0 | Status: SHIPPED | OUTPATIENT
Start: 2025-01-15

## 2025-01-15 NOTE — TELEPHONE ENCOUNTER
Please Review. Protocol Failed; No Protocol     Requested Prescriptions   Pending Prescriptions Disp Refills    POTASSIUM CHLORIDE ER 10 MEQ Oral Tab CR [Pharmacy Med Name: Potassium Chloride Er 10 Meq Tab Upsh] 180 tablet 0     Sig: TAKE TWO TABLETS BY MOUTH DAILY       There is no refill protocol information for this order              Recent Outpatient Visits              5 months ago Controlled type 2 diabetes mellitus without complication, without long-term current use of insulin (HCC)    [facility]    [facility]    11 months ago Hyperlipidemia, unspecified hyperlipidemia type    [facility]    [facility] Visit    1 year ago Cough, unspecified type    [facility]    Office Visit    1 year ago Varicose veins of both legs with edema    [facility], Isela Chris DPM    Office Visit    1 year ago Ganglion cyst of left foot    [facility]    Office Visit

## 2025-01-20 RX ORDER — POTASSIUM CHLORIDE 750 MG/1
20 TABLET, EXTENDED RELEASE ORAL DAILY
Qty: 180 TABLET | Refills: 0 | OUTPATIENT
Start: 2025-01-20

## 2025-01-20 RX ORDER — LOSARTAN POTASSIUM 100 MG/1
100 TABLET ORAL DAILY
Qty: 90 TABLET | Refills: 3 | OUTPATIENT
Start: 2025-01-20

## 2025-01-20 RX ORDER — FUROSEMIDE 20 MG/1
20 TABLET ORAL DAILY
Qty: 90 TABLET | Refills: 3 | OUTPATIENT
Start: 2025-01-20

## 2025-01-20 RX ORDER — FUROSEMIDE 20 MG/1
20 TABLET ORAL DAILY
Qty: 90 TABLET | Refills: 0 | Status: SHIPPED | OUTPATIENT
Start: 2025-01-20

## 2025-01-20 RX ORDER — LOSARTAN POTASSIUM 100 MG/1
100 TABLET ORAL DAILY
Qty: 90 TABLET | Refills: 0 | Status: SHIPPED | OUTPATIENT
Start: 2025-01-20

## 2025-01-22 ENCOUNTER — TELEPHONE (OUTPATIENT)
Dept: FAMILY MEDICINE CLINIC | Facility: CLINIC | Age: 70
End: 2025-01-22

## 2025-01-22 NOTE — TELEPHONE ENCOUNTER
Patient called stating she received letter from [facility] stating [facility] requires prior auth per new pharmacy benefits.   Confirmed pharmacy benefits:    ID # [id number]   Group:   BIN: [id number]  PCN: [id number]     Advised she will be notified once a response is received from insurance.

## 2025-01-22 NOTE — TELEPHONE ENCOUNTER
Prior auth completed via CoverMyMeds and approved.   (Key: [id number])  Patient notified via zPerfectGifthart.

## 2025-04-01 NOTE — TELEPHONE ENCOUNTER
Enter Query Response Below      Query Response: Yes    Distal esophagus, biopsy:  - Squamocolumnar mucosa with intestinal metaplasia  - Negative for dysplasia and malignancy          If applicable, please update the problem list.     Updated discharge summary     Electronically signed by Leonard Multani DO, 04/01/25, 4:01 PM EDT.     Spoke with pt, verified , informed pt that order for FIT test has been placed, pt states she received kit and will drop it off this Saturday morning.

## 2025-04-16 ENCOUNTER — OFFICE VISIT (OUTPATIENT)
Dept: FAMILY MEDICINE CLINIC | Facility: CLINIC | Age: 70
End: 2025-04-16
Payer: MEDICARE

## 2025-04-16 ENCOUNTER — LAB ENCOUNTER (OUTPATIENT)
Dept: LAB | Age: 70
End: 2025-04-16
Attending: FAMILY MEDICINE
Payer: MEDICARE

## 2025-04-16 VITALS
DIASTOLIC BLOOD PRESSURE: 80 MMHG | HEIGHT: 67 IN | HEART RATE: 80 BPM | WEIGHT: 237.19 LBS | BODY MASS INDEX: 37.23 KG/M2 | SYSTOLIC BLOOD PRESSURE: 135 MMHG

## 2025-04-16 DIAGNOSIS — E55.9 VITAMIN D DEFICIENCY: ICD-10-CM

## 2025-04-16 DIAGNOSIS — Z12.31 SCREENING MAMMOGRAM FOR BREAST CANCER: ICD-10-CM

## 2025-04-16 DIAGNOSIS — Z78.0 POST-MENOPAUSAL: ICD-10-CM

## 2025-04-16 DIAGNOSIS — E11.9 CONTROLLED TYPE 2 DIABETES MELLITUS WITHOUT COMPLICATION, WITHOUT LONG-TERM CURRENT USE OF INSULIN (HCC): Primary | ICD-10-CM

## 2025-04-16 DIAGNOSIS — E78.2 MIXED HYPERLIPIDEMIA: ICD-10-CM

## 2025-04-16 DIAGNOSIS — Z00.00 ENCOUNTER FOR ANNUAL HEALTH EXAMINATION: ICD-10-CM

## 2025-04-16 DIAGNOSIS — E66.01 OBESITY, MORBID (HCC): ICD-10-CM

## 2025-04-16 DIAGNOSIS — I10 ESSENTIAL HYPERTENSION: ICD-10-CM

## 2025-04-16 DIAGNOSIS — E11.9 CONTROLLED TYPE 2 DIABETES MELLITUS WITHOUT COMPLICATION, WITHOUT LONG-TERM CURRENT USE OF INSULIN (HCC): ICD-10-CM

## 2025-04-16 LAB
ALBUMIN SERPL-MCNC: 4.6 G/DL (ref 3.2–4.8)
ALBUMIN/GLOB SERPL: 1.8 {RATIO} (ref 1–2)
ALP LIVER SERPL-CCNC: 93 U/L (ref 55–142)
ALT SERPL-CCNC: 18 U/L (ref 10–49)
ANION GAP SERPL CALC-SCNC: 9 MMOL/L (ref 0–18)
AST SERPL-CCNC: 20 U/L (ref ?–34)
BASOPHILS # BLD AUTO: 0.04 X10(3) UL (ref 0–0.2)
BASOPHILS NFR BLD AUTO: 0.5 %
BILIRUB SERPL-MCNC: 1.3 MG/DL (ref 0.2–1.1)
BUN BLD-MCNC: 11 MG/DL (ref 9–23)
BUN/CREAT SERPL: 14.5 (ref 10–20)
CALCIUM BLD-MCNC: 9.3 MG/DL (ref 8.7–10.4)
CHLORIDE SERPL-SCNC: 107 MMOL/L (ref 98–112)
CHOLEST SERPL-MCNC: 142 MG/DL (ref ?–200)
CO2 SERPL-SCNC: 27 MMOL/L (ref 21–32)
CREAT BLD-MCNC: 0.76 MG/DL (ref 0.55–1.02)
CREAT UR-SCNC: 11.1 MG/DL
DEPRECATED RDW RBC AUTO: 41.1 FL (ref 35.1–46.3)
EGFRCR SERPLBLD CKD-EPI 2021: 85 ML/MIN/1.73M2 (ref 60–?)
EOSINOPHIL # BLD AUTO: 0.2 X10(3) UL (ref 0–0.7)
EOSINOPHIL NFR BLD AUTO: 2.7 %
ERYTHROCYTE [DISTWIDTH] IN BLOOD BY AUTOMATED COUNT: 13.2 % (ref 11–15)
EST. AVERAGE GLUCOSE BLD GHB EST-MCNC: 117 MG/DL (ref 68–126)
FASTING PATIENT LIPID ANSWER: YES
FASTING STATUS PATIENT QL REPORTED: YES
GLOBULIN PLAS-MCNC: 2.5 G/DL (ref 2–3.5)
GLUCOSE BLD-MCNC: 83 MG/DL (ref 70–99)
HBA1C MFR BLD: 5.7 % (ref ?–5.7)
HCT VFR BLD AUTO: 41.8 % (ref 35–48)
HDLC SERPL-MCNC: 53 MG/DL (ref 40–59)
HGB BLD-MCNC: 13.6 G/DL (ref 12–16)
IMM GRANULOCYTES # BLD AUTO: 0.02 X10(3) UL (ref 0–1)
IMM GRANULOCYTES NFR BLD: 0.3 %
LDLC SERPL CALC-MCNC: 69 MG/DL (ref ?–100)
LYMPHOCYTES # BLD AUTO: 2.19 X10(3) UL (ref 1–4)
LYMPHOCYTES NFR BLD AUTO: 29.4 %
MCH RBC QN AUTO: 28 PG (ref 26–34)
MCHC RBC AUTO-ENTMCNC: 32.5 G/DL (ref 31–37)
MCV RBC AUTO: 86.2 FL (ref 80–100)
MICROALBUMIN UR-MCNC: <0.3 MG/DL
MONOCYTES # BLD AUTO: 0.47 X10(3) UL (ref 0.1–1)
MONOCYTES NFR BLD AUTO: 6.3 %
NEUTROPHILS # BLD AUTO: 4.52 X10 (3) UL (ref 1.5–7.7)
NEUTROPHILS # BLD AUTO: 4.52 X10(3) UL (ref 1.5–7.7)
NEUTROPHILS NFR BLD AUTO: 60.8 %
NONHDLC SERPL-MCNC: 89 MG/DL (ref ?–130)
OSMOLALITY SERPL CALC.SUM OF ELEC: 295 MOSM/KG (ref 275–295)
PLATELET # BLD AUTO: 246 10(3)UL (ref 150–450)
POTASSIUM SERPL-SCNC: 4.1 MMOL/L (ref 3.5–5.1)
PROT SERPL-MCNC: 7.1 G/DL (ref 5.7–8.2)
RBC # BLD AUTO: 4.85 X10(6)UL (ref 3.8–5.3)
SODIUM SERPL-SCNC: 143 MMOL/L (ref 136–145)
TRIGL SERPL-MCNC: 111 MG/DL (ref 30–149)
TSI SER-ACNC: 0.76 UIU/ML (ref 0.55–4.78)
VIT B12 SERPL-MCNC: 747 PG/ML (ref 211–911)
VIT D+METAB SERPL-MCNC: 49.8 NG/ML (ref 30–100)
VLDLC SERPL CALC-MCNC: 17 MG/DL (ref 0–30)
WBC # BLD AUTO: 7.4 X10(3) UL (ref 4–11)

## 2025-04-16 PROCEDURE — 85025 COMPLETE CBC W/AUTO DIFF WBC: CPT

## 2025-04-16 PROCEDURE — 84443 ASSAY THYROID STIM HORMONE: CPT

## 2025-04-16 PROCEDURE — 1170F FXNL STATUS ASSESSED: CPT | Performed by: FAMILY MEDICINE

## 2025-04-16 PROCEDURE — 80061 LIPID PANEL: CPT

## 2025-04-16 PROCEDURE — 1126F AMNT PAIN NOTED NONE PRSNT: CPT | Performed by: FAMILY MEDICINE

## 2025-04-16 PROCEDURE — 82306 VITAMIN D 25 HYDROXY: CPT

## 2025-04-16 PROCEDURE — 82043 UR ALBUMIN QUANTITATIVE: CPT

## 2025-04-16 PROCEDURE — G0439 PPPS, SUBSEQ VISIT: HCPCS | Performed by: FAMILY MEDICINE

## 2025-04-16 PROCEDURE — 82607 VITAMIN B-12: CPT

## 2025-04-16 PROCEDURE — 82570 ASSAY OF URINE CREATININE: CPT

## 2025-04-16 PROCEDURE — 80053 COMPREHEN METABOLIC PANEL: CPT

## 2025-04-16 PROCEDURE — 36415 COLL VENOUS BLD VENIPUNCTURE: CPT

## 2025-04-16 PROCEDURE — 83036 HEMOGLOBIN GLYCOSYLATED A1C: CPT

## 2025-04-16 RX ORDER — POTASSIUM CHLORIDE 750 MG/1
20 TABLET, EXTENDED RELEASE ORAL DAILY
Qty: 180 TABLET | Refills: 3 | OUTPATIENT
Start: 2025-04-16

## 2025-04-16 RX ORDER — FUROSEMIDE 20 MG/1
20 TABLET ORAL DAILY
Qty: 90 TABLET | Refills: 4 | Status: SHIPPED | OUTPATIENT
Start: 2025-04-16

## 2025-04-16 RX ORDER — ORAL SEMAGLUTIDE 14 MG/1
14 TABLET ORAL DAILY
Qty: 90 TABLET | Refills: 4 | Status: SHIPPED | OUTPATIENT
Start: 2025-04-16

## 2025-04-16 RX ORDER — LOSARTAN POTASSIUM 100 MG/1
100 TABLET ORAL DAILY
Qty: 90 TABLET | Refills: 4 | Status: SHIPPED | OUTPATIENT
Start: 2025-04-16

## 2025-04-16 RX ORDER — OMEPRAZOLE 20 MG/1
20 CAPSULE, DELAYED RELEASE ORAL
Qty: 90 CAPSULE | Refills: 4 | Status: SHIPPED | OUTPATIENT
Start: 2025-04-16

## 2025-04-16 RX ORDER — ROSUVASTATIN CALCIUM 5 MG/1
5 TABLET, COATED ORAL NIGHTLY
Qty: 90 TABLET | Refills: 4 | Status: SHIPPED | OUTPATIENT
Start: 2025-04-16

## 2025-04-16 RX ORDER — ROSUVASTATIN CALCIUM 5 MG/1
5 TABLET, COATED ORAL NIGHTLY
Qty: 90 TABLET | Refills: 0 | OUTPATIENT
Start: 2025-04-16

## 2025-04-16 RX ORDER — POTASSIUM CHLORIDE 750 MG/1
20 TABLET, EXTENDED RELEASE ORAL DAILY
Qty: 180 TABLET | Refills: 4 | Status: SHIPPED | OUTPATIENT
Start: 2025-04-16

## 2025-04-16 NOTE — PROGRESS NOTES
Subjective:   Natalia Calhoun is a 69 year old female who presents for a MA AHA (Medicare Advantage Annual Health Assessment) and Medicare Subsequent Annual Wellness visit (Pt already had Initial Annual Wellness) and scheduled follow up of multiple significant but stable problems.   History of Present Illness            Still working at [employer] varying hours but almost full time. Left foot still bothers her - on top has spur.   History/Other:   Fall Risk Assessment:   She has been screened for Falls and is low risk.      Cognitive Assessment:   Abnormal  What day of the week is this?: Correct  What month is it?: Correct  What year is it?: Correct  Recall \"Ball\": Correct  Recall \"Flag\": Incorrect  Recall \"Tree\": Correct    Functional Ability/Status:   Natalia Calhoun has some abnormal functions as listed below:  She has Walking problems based on screening of functional status.       Depression Screening (PHQ):  PHQ-2 SCORE: 0  , done 2025          Advanced Directives:   She does have a Living Will but we do NOT have it on file in Epic.    She does have a POA but we do NOT have it on file in Epic.    Discussed Advance Care Planning with patient (and family/surrogate if present). Standard forms made available to patient in After Visit Summary.      Problem List[1]  Allergies:  She has no known allergies.    Current Medications:  Active Meds, Sig Only[2]    Medical History:  She  has a past medical history of High blood pressure, Obesity, Ovarian cyst, Pneumonia due to organism, Unspecified essential hypertension, and Visual impairment.  Surgical History:  She  has a past surgical history that includes  and removal of ovarian cyst(s).   Family History:  Her family history includes Anemia in her father; Heart Disease in her father; Heart Disorder in her mother; Kidney Disease in her mother; Stroke in her mother.  Social History:  She  reports that she quit smoking about 13 years ago. Her smoking use included  cigarettes. She has never been exposed to tobacco smoke. She has never used smokeless tobacco. She reports that she does not currently use alcohol. She reports that she does not use drugs.    Tobacco:  She smoked tobacco in the past but quit greater than 12 months ago.  Tobacco Use[3]     CAGE Alcohol Screen:   CAGE screening score of 0 on 4/9/2025, showing low risk of alcohol abuse.      Patient Care Team:  Abida Alvarenga MD as PCP - General    Review of Systems     Negative except see HPI     Objective:   Physical Exam  Constitutional:       Appearance: She is well-developed.   HENT:      Right Ear: Tympanic membrane normal.      Left Ear: Tympanic membrane normal.   Eyes:      Conjunctiva/sclera: Conjunctivae normal.   Cardiovascular:      Rate and Rhythm: Normal rate and regular rhythm.      Heart sounds: Normal heart sounds.   Pulmonary:      Effort: Pulmonary effort is normal.      Breath sounds: Normal breath sounds.   Abdominal:      General: Bowel sounds are normal.      Palpations: Abdomen is soft.   Neurological:      Mental Status: She is alert and oriented to person, place, and time.   Psychiatric:         Behavior: Behavior normal.         Bilateral barefoot skin diabetic exam is normal, visualized feet and the appearance is normal. Ankle swelling at baseline.   Bilateral monofilament/sensation of both feet is normal.  Pulsation pedal pulse exam of both lower legs/feet is normal as well.     /80   Pulse 80   Ht 5' 7\" (1.702 m)   Wt 237 lb 3.2 oz (107.6 kg)   BMI 37.15 kg/m²  Estimated body mass index is 37.15 kg/m² as calculated from the following:    Height as of this encounter: 5' 7\" (1.702 m).    Weight as of this encounter: 237 lb 3.2 oz (107.6 kg).    Medicare Hearing Assessment:   Hearing Screening    Time taken: 4/16/2025  9:31 AM  Screening Method: Questionnaire  I have a problem hearing over the telephone: No I have trouble following the conversations when two or more people are  talking at the same time: No   I have trouble understanding things on the TV: No I have to strain to understand conversations: No   I have to worry about missing the telephone ring or doorbell: No I have trouble hearing conversations in a noisy background such as a crowded room or restaurant: No   I get confused about where sounds come from: No I misunderstand some words in a sentence and need to ask people to repeat themselves: No   I especially have trouble understanding the speech of women and children: No I have trouble understanding the speaker in a large room such as at a meeting or place of [employer]: No   Many people I talk to seem to mumble (or don't speak clearly): No People get annoyed because I misunderstand what they say: No   I misunderstand what others are saying and make inappropriate responses: No I avoid social activities because I cannot hear well and fear I will reply improperly: No   Family members and friends have told me they think I may have hearing loss: No             Visual Acuity:   Right Eye Visual Acuity: Corrected Right Eye Chart Acuity: 20/30   Left Eye Visual Acuity: Corrected Left Eye Chart Acuity: 20/30   Both Eyes Visual Acuity: Corrected Both Eyes Chart Acuity: 20/30            Assessment & Plan:   Natalia Calhoun is a 69 year old female who presents for a Medicare Assessment.     1. Controlled type 2 diabetes mellitus without complication, without long-term current use of insulin (HCC)  Has been stable on rybelsus.continue same.    - Hemoglobin A1C; Future  - Microalb/Creat Ratio, Random Urine; Future  - CBC With Differential With Platelet; Future  - Comp Metabolic Panel (14); Future  - Lipid Panel; Future  - Vitamin D; Future  - Vitamin B12; Future  - TSH W Reflex To Free T4; Future    2. Obesity, morbid (HCC)  Contributing to diabetes, HTN - continue to work on increased movement and exercise.     3. Essential hypertension  BP controlled     4. Mixed hyperlipidemia  Stable on  statin.     5. Encounter for annual health examination    - Hemoglobin A1C; Future  - Microalb/Creat Ratio, Random Urine; Future  - CBC With Differential With Platelet; Future  - Comp Metabolic Panel (14); Future  - Lipid Panel; Future  - Vitamin D; Future  - Vitamin B12; Future  - TSH W Reflex To Free T4; Future    6. Vitamin D deficiency    - Vitamin D; Future    7. Post-menopausal    - XR DEXA BONE DENSITOMETRY (CPT=77080); Future    8. Screening mammogram for breast cancer    - [facility] ALAN 2D+3D SCREENING BILAT (CPT=77067/35700); Future        The patient indicates understanding of these issues and agrees to the plan.  Reinforced healthy diet, lifestyle, and exercise.      No follow-ups on file.     Abida Alvarenga MD, 4/16/2025     Supplementary Documentation:   General Health:  In the past six months, have you lost more than 10 pounds without trying?: (Patient-Rptd) 2 - No  Has your appetite been poor?: (Patient-Rptd) No  Type of Diet: (Patient-Rptd) Balanced, Vegetarian, Diabetic, Low Salt, Low Carb  How does the patient maintain a good energy level?: (Patient-Rptd) Daily Walks  How would you describe your daily physical activity?: (Patient-Rptd) Moderate  How would you describe your current health state?: (Patient-Rptd) Good  How do you maintain positive mental well-being?: (Patient-Rptd) Social Interaction, Puzzles, Games, Visiting Friends, Visiting Family  On a scale of 0 to 10, with 0 being no pain and 10 being severe pain, what is your pain level?: (Patient-Rptd) 7 - (Severe)  In the past six months, have you experienced urine leakage?: (Patient-Rptd) 0-No  At any time do you feel concerned for the safety/well-being of yourself and/or your children, in your home or elsewhere?: (Patient-Rptd) No  Have you had any immunizations at another office such as Influenza, Hepatitis B, Tetanus, or Pneumococcal?: (Patient-Rptd) Yes    Health Maintenance   Topic Date Due    Diabetes Care Dilated Eye Exam  Never done     COVID-19 Vaccine (4 - 2024-25 season) 09/01/2024    Annual Well Visit  01/01/2025    Diabetes Care: Foot Exam (Annual)  01/01/2025    Diabetes Care: Microalb/Creat Ratio (Annual)  01/01/2025    Diabetes Care A1C  06/03/2025    Mammogram  10/07/2025    Diabetes Care: GFR  12/03/2025    Colorectal Cancer Screening  09/10/2027    Influenza Vaccine  Completed    DEXA Scan  Completed    Annual Depression Screening  Completed    Fall Risk Screening (Annual)  Completed    Pneumococcal Vaccine: 50+ Years  Completed    Zoster Vaccines  Completed    Meningococcal B Vaccine  Aged Out            [1]   Patient Active Problem List  Diagnosis    Essential hypertension    Obesity, morbid (HCC)    Hyperlipidemia    Controlled type 2 diabetes mellitus without complication, without long-term current use of insulin (HCC)   [2]   Outpatient Medications Marked as Taking for the 4/16/25 encounter (Office Visit) with Abida Alvarenga MD   Medication Sig    furosemide 20 MG Oral Tab Take 1 tablet (20 mg total) by mouth daily.    losartan 100 MG Oral Tab Take 1 tablet (100 mg total) by mouth daily.    Potassium Chloride ER 10 MEQ Oral Tab CR Take 2 tablets (20 mEq total) by mouth daily.    omeprazole 20 MG Oral Capsule Delayed Release Take 1 capsule (20 mg total) by mouth before breakfast.    Glucose Blood (ONETOUCH VERIO) In Vitro Strip USE 1 (ONE) NEW STRIP TWICE DAILY TO TEST BLOOD SUGARS, AS DIRECTED BY DIABETIC EDUCATOR'S INSTRUCTIONS    Lancets (ONETOUCH DELICA PLUS [id number]) Does not apply Misc 1 Lancet by Finger stick route 2 (two) times daily. USE 1 (ONE) NEW LANCET TWICE DAILY TO TEST BLOOD SUGARS, AS DIRECTED BY DIABETIC EDUCATOR'S INSTRUCTIONS    cyclobenzaprine 5 MG Oral Tab Take 1 tablet (5 mg total) by mouth nightly.    rosuvastatin 5 MG Oral Tab Take 1 tablet (5 mg total) by mouth nightly.    Semaglutide (RYBELSUS) 14 MG Oral Tab Take 14 mg by mouth daily.    Blood Glucose Monitoring Suppl (ONETOUCH VERIO FLEX SYSTEM)  w/Device Does not apply Kit 1 kit 2 (two) times daily. May substitute per insurance formulary   [3]   Social History  Tobacco Use   Smoking Status Former    Current packs/day: 0.00    Types: Cigarettes    Quit date: 2012    Years since quittin.2    Passive exposure: Never   Smokeless Tobacco Never

## 2025-04-28 ENCOUNTER — MED REC SCAN ONLY (OUTPATIENT)
Dept: FAMILY MEDICINE CLINIC | Facility: CLINIC | Age: 70
End: 2025-04-28

## 2025-05-19 ENCOUNTER — TELEPHONE (OUTPATIENT)
Dept: FAMILY MEDICINE CLINIC | Facility: CLINIC | Age: 70
End: 2025-05-19

## 2025-05-19 NOTE — TELEPHONE ENCOUNTER
A PRIOR AUTHORIZATION HAS BEEN STARTED FOR   [id number]  Patient last name Lj PRETTY 1955

## 2025-05-28 ENCOUNTER — HOSPITAL ENCOUNTER (OUTPATIENT)
Dept: BONE DENSITY | Age: 70
Discharge: HOME | End: 2025-05-28
Attending: FAMILY MEDICINE
Payer: MEDICARE

## 2025-05-28 DIAGNOSIS — Z78.0 POST-MENOPAUSAL: ICD-10-CM

## 2025-05-28 PROCEDURE — 77080 DXA BONE DENSITY AXIAL: CPT | Performed by: FAMILY MEDICINE

## 2025-06-18 ENCOUNTER — NURSE TRIAGE (OUTPATIENT)
Dept: FAMILY MEDICINE CLINIC | Facility: CLINIC | Age: 70
End: 2025-06-18

## 2025-06-18 NOTE — TELEPHONE ENCOUNTER
Action Requested: Summary for Provider     []  Critical Lab, Recommendations Needed  [x] Need Additional Advice  []   FYI    []   Need Orders  [] Need Medications Sent to Pharmacy  []  Other     SUMMARY: Patient calling, has earache and sore throat that just started this morning. Patient has had similar symptoms before and was given amoxicillin and is asking if this can be sent in.  Advised patient should be seen if wanting/requesting ABT, she is working today and is off tomorrow but no visits in [place], offered [place] but she declined this and wants message sent or she \"will just try OTC's and see if they help\".    Not taking anything currently. No fever. No cough or congestion.    Patient requesting message be sent to Dr Alvarenga.   Please advise.     Gave home care instructions, advised warm fluids, advil.     Reason for call: Earache  Onset: today

## 2025-06-18 NOTE — TELEPHONE ENCOUNTER
Agree. 1 day of symptoms does not need antibiotics - may be just viral and resolve within few days. If worsening, call back.

## (undated) DIAGNOSIS — Z12.11 COLON CANCER SCREENING: Primary | ICD-10-CM

## (undated) NOTE — LETTER
12/15/20        [facility]  [address]  [place] [address]      Dear Marilynn [facility],    [address] [facility] records indicate that you have outstanding lab work and or testing that was ordered for you and has not yet been completed:  Orders Placed This Encounter

## (undated) NOTE — MR AVS SNAPSHOT
[address], [address]  [address] [facility]  [phone]               Thank you for choosing us for your health care visit with Ashwin Berg MD.  We are glad to serve you and happy to provide you with this summ - guaiFENesin-codeine 100-10 MG/5ML Soln            Follow-up Instructions     Return if symptoms worsen or fail to improve. [facility]     Sign up for [facility], your secure online medical record.   [facility] will allow you to access patient instructions

## (undated) NOTE — LETTER
08/27/19    [facility]  [address]  [place] [address]      Dear Nay Hernandez,    [address] [facility] records indicate that you have outstanding lab work and or testing that was ordered for you and has not yet been completed:  Orders Placed This Encounter      Occult

## (undated) NOTE — LETTER
3/4/2020          To Whom It May Concern:    Frank Bearden is currently under my medical care and may not return to work at this time. Please excuse Marilynn Mckee for 1 days. She may return to work  on 3/5/2020. Activity is restricted as follows: none.     If

## (undated) NOTE — LETTER
July 27, 2017         Chares Kehr, [address] [facility]  [address]  [address]      Patient: Catracho Terry   YOB: 1955   Date of Visit: 7/27/2017       Dear Dr. Jameel Sanford MD,    I saw your patient, Catracho Terry, on 7/27

## (undated) NOTE — LETTER
12/14/2023          To Whom It May Concern:    Todd Archibald is currently under my medical care at this time. Please excuse Kate Ayala for yesterday 12/13/23 and tomorrow 12/15/23. She may return to work on Monday 12/18/23. Activity is restricted as follows: none. If you require additional information please contact our office.         Sincerely,    LEONA Bynum            Document generated by:  LEONA Bynum

## (undated) NOTE — LETTER
4/19/2018      To Whom It May Concern:    Sabina Moscoso is currently under my medical care and may return to work on 4/23/18. Activity is restricted as follows: none. If you require additional information please contact our office.       Jovanny Weathers

## (undated) NOTE — LETTER
02/24/21        [facility]  [address] [facility] [address]  [place] [address]      Dear Shy Del Real,    [address] [facility] records indicate that you have outstanding lab work and or testing that was ordered for you and has not yet been completed:  Orders Placed This Encounter      L